# Patient Record
Sex: FEMALE | Race: WHITE | NOT HISPANIC OR LATINO | Employment: FULL TIME | URBAN - METROPOLITAN AREA
[De-identification: names, ages, dates, MRNs, and addresses within clinical notes are randomized per-mention and may not be internally consistent; named-entity substitution may affect disease eponyms.]

---

## 2019-01-31 NOTE — PROGRESS NOTES
ANNUAL VISIT NOTE     PRESENTING HISTORY     Reason for Visit:  Annual visit.    No chief complaint on file.      History of Present Illness & ROS: Ms. Divina Salmeron is a 33 y.o. female.  Here to establish care. New to this provider and clinic. Moved from [place]. Works in garden center.   Complaining of itching to arms and neck.    Review of Systems:  Eyes: denies visual changes at this time denies floaters   ENT: no nasal congestion or sore throat  Respiratory: no cough or shorness of breath  Cardiovascular: no chest pain or palpitations  Gastrointestinal: no nausea or vomiting, no abdominal pain or change in bowel habits  Genitourinary: no hematuria or dysuria; denies frequency  Hematologic/Lymphatic: no easy bruising or lymphadenopathy  Musculoskeletal: no arthralgias or myalgias  Neurological: no seizures or tremors  Endocrine: no heat or cold intolerance    PAST HISTORY:     Past Medical History:   Diagnosis Date    Oral herpes        No past surgical history on file.    Family History   Problem Relation Age of Onset    No Known Problems Paternal Grandfather     Hypertension Father     Diabetes Mother     Hypertension Mother     Breast cancer Paternal Aunt     Cancer Other        Social History     Socioeconomic History    Marital status: Single     Spouse name: Warren Ramos    Number of children: Not on file    Years of education: Not on file    Highest education level: Not on file   Social Needs    Financial resource strain: Not on file    Food insecurity - worry: Not on file    Food insecurity - inability: Not on file    Transportation needs - medical: Not on file    Transportation needs - non-medical: Not on file   Occupational History    Occupation: garden center      Employer: [employer]   Tobacco Use    Smoking status: Former Smoker    Smokeless tobacco: Never Used   Substance and Sexual Activity    Alcohol use: No     Comment: not with pregnancy    Drug use: No     "Sexual activity: Yes     Partners: Male   Other Topics Concern    Not on file   Social History Narrative    Pt: worked at "[employer]" likes outdoors    Partner: Warren - works at  [employer], bipolar OCD, heart disorder    Son: Josef       MEDICATIONS & ALLERGIES:     Current Outpatient Medications on File Prior to Visit   Medication Sig Dispense Refill    ibuprofen (ADVIL,MOTRIN) 600 MG tablet Take 1 tablet (600 mg total) by mouth every 6 (six) hours as needed for Pain (for pain). 30 tablet 1    PNV #35-IRON-FA #6-DHA ORAL Take by mouth.       No current facility-administered medications on file prior to visit.         Review of patient's allergies indicates:  No Known Allergies    Medications Reconciliation:   I have reconciled the patient's home medications and discharge medications with the patient/family. I have updated all changes.  Refer to After-Visit Medication List.    OBJECTIVE:     Vital Signs:  There were no vitals filed for this visit.  Wt Readings from Last 1 Encounters:   02/27/15 0627 88.5 kg (195 lb)   02/27/15 0140 88 kg (194 lb)     There is no height or weight on file to calculate BMI.   Wt Readings from Last 3 Encounters:   02/01/19 72 kg (158 lb 11.7 oz)   02/27/15 88.5 kg (195 lb)   02/25/15 88 kg (194 lb)     Temp Readings from Last 3 Encounters:   03/01/15 97.8 °F (36.6 °C) (Oral)     BP Readings from Last 3 Encounters:   02/01/19 117/74   03/30/15 120/82   03/01/15 110/62     Pulse Readings from Last 3 Encounters:   02/01/19 72   03/01/15 62       Physical Exam:  General: Well developed, well nourished. No distress.  HEENT: Head is normocephalic, atraumatic; ears are normal.   Eyes: Clear conjunctiva.  Neck: Supple, symmetrical neck; trachea midline.  Lungs: Clear to auscultation bilaterally and normal respiratory effort.  Cardiovascular: Heart with regular rate and rhythm. No murmurs, gallops or rubs  Extremities: No LE edema. Pulses 2+ and symmetric.   Abdomen: Abdomen is soft, " non-tender non-distended with normal bowel sounds.  Skin: Skin color, texture, turgor normal. No rashes.  Musculoskeletal: Normal gait.   Lymph Nodes: No cervical or supraclavicular adenopathy.  Neurologic: Normal strength and tone. No focal numbness or weakness.   Psychiatric: Not depressed.      Laboratory  Lab Results   Component Value Date    WBC 16.86 (H) 10/22/2014    HGB 10.9 (L) 10/22/2014    HCT 32.8 (L) 10/22/2014     10/22/2014         ASSESSMENT & PLAN:       Here for Preventative Wellness / Annual :  Labs:   CBC  CMP  Lipid Panel   TSH  A1c  Vitamin D Level  Ferritin      Screening:   Gyn: PAP and HPV testing:       Immunizations:   TDaP: today   Flu Vaccine: today       Atopic Dermatitis:   ` TAC  ` PRN Atarax  ` daily Xyzal  *Referral to Derm if not improving.    *Suggest follow up in 6 months. Sooner if indicated.       Future Appointments   Date Time Provider Department Center   2/1/2019  7:30 AM RANID Villar [facility] Chance Ernandez PCW          Medication List           Accurate as of 2/1/19  8:23 AM. If you have any questions, ask your nurse or doctor.               START taking these medications    hydrOXYzine HCl 25 MG tablet  Commonly known as:  ATARAX  Take 1 tablet (25 mg total) by mouth 3 (three) times daily as needed for Itching.  Started by:  RANDI Oviedo     levocetirizine 5 MG tablet  Commonly known as:  XYZAL  Take 1 tablet (5 mg total) by mouth every evening.  Started by:  RANDI Oviedo     triamcinolone acetonide 0.1% 0.1 % ointment  Commonly known as:  KENALOG  Apply topically 2 (two) times daily.  Started by:  RANDI Oviedo        CONTINUE taking these medications    ibuprofen 600 MG tablet  Commonly known as:  ADVIL,MOTRIN  Take 1 tablet (600 mg total) by mouth every 6 (six) hours as needed for Pain (for pain).     PNV #35-IRON-FA #6-DHA ORAL           Where to Get Your Medications      These medications were sent to  [id number]  [place] - [address] AT [facility] & [address]  [address] [address]    Hours:  24-hours Phone:  [phone]   · hydrOXYzine HCl 25 MG tablet  · levocetirizine 5 MG tablet  · triamcinolone acetonide 0.1% 0.1 % ointment         Signing Physician:  RANDI Oviedo

## 2019-02-01 ENCOUNTER — OFFICE VISIT (OUTPATIENT)
Dept: INTERNAL MEDICINE | Facility: CLINIC | Age: 34
End: 2019-02-01
Payer: COMMERCIAL

## 2019-02-01 ENCOUNTER — TELEPHONE (OUTPATIENT)
Dept: INTERNAL MEDICINE | Facility: CLINIC | Age: 34
End: 2019-02-01

## 2019-02-01 ENCOUNTER — LAB VISIT (OUTPATIENT)
Dept: LAB | Facility: HOSPITAL | Age: 34
End: 2019-02-01
Attending: NURSE PRACTITIONER
Payer: COMMERCIAL

## 2019-02-01 ENCOUNTER — IMMUNIZATION (OUTPATIENT)
Dept: PHARMACY | Facility: CLINIC | Age: 34
End: 2019-02-01

## 2019-02-01 ENCOUNTER — IMMUNIZATION (OUTPATIENT)
Dept: PHARMACY | Facility: CLINIC | Age: 34
End: 2019-02-01
Payer: COMMERCIAL

## 2019-02-01 VITALS
BODY MASS INDEX: 26.45 KG/M2 | OXYGEN SATURATION: 99 % | SYSTOLIC BLOOD PRESSURE: 117 MMHG | HEART RATE: 72 BPM | DIASTOLIC BLOOD PRESSURE: 74 MMHG | HEIGHT: 65 IN | WEIGHT: 158.75 LBS

## 2019-02-01 DIAGNOSIS — L20.9 ATOPIC DERMATITIS, UNSPECIFIED TYPE: ICD-10-CM

## 2019-02-01 DIAGNOSIS — Z00.00 PREVENTATIVE HEALTH CARE: Primary | ICD-10-CM

## 2019-02-01 DIAGNOSIS — Z00.00 PREVENTATIVE HEALTH CARE: ICD-10-CM

## 2019-02-01 DIAGNOSIS — E78.00 ELEVATED CHOLESTEROL: Primary | ICD-10-CM

## 2019-02-01 DIAGNOSIS — E55.9 VITAMIN D INSUFFICIENCY: ICD-10-CM

## 2019-02-01 LAB
25(OH)D3+25(OH)D2 SERPL-MCNC: 24 NG/ML
ALBUMIN SERPL BCP-MCNC: 3.9 G/DL
ALP SERPL-CCNC: 53 U/L
ALT SERPL W/O P-5'-P-CCNC: 12 U/L
ANION GAP SERPL CALC-SCNC: 8 MMOL/L
AST SERPL-CCNC: 15 U/L
BASOPHILS # BLD AUTO: 0.03 K/UL
BASOPHILS NFR BLD: 0.5 %
BILIRUB SERPL-MCNC: 0.6 MG/DL
BUN SERPL-MCNC: 14 MG/DL
CALCIUM SERPL-MCNC: 9.5 MG/DL
CHLORIDE SERPL-SCNC: 105 MMOL/L
CHOLEST SERPL-MCNC: 216 MG/DL
CHOLEST/HDLC SERPL: 3.6 {RATIO}
CO2 SERPL-SCNC: 28 MMOL/L
CREAT SERPL-MCNC: 0.8 MG/DL
DIFFERENTIAL METHOD: NORMAL
EOSINOPHIL # BLD AUTO: 0.2 K/UL
EOSINOPHIL NFR BLD: 2.4 %
ERYTHROCYTE [DISTWIDTH] IN BLOOD BY AUTOMATED COUNT: 12.9 %
EST. GFR  (AFRICAN AMERICAN): >60 ML/MIN/1.73 M^2
EST. GFR  (NON AFRICAN AMERICAN): >60 ML/MIN/1.73 M^2
ESTIMATED AVG GLUCOSE: 111 MG/DL
FERRITIN SERPL-MCNC: 51 NG/ML
GLUCOSE SERPL-MCNC: 88 MG/DL
HBA1C MFR BLD HPLC: 5.5 %
HCT VFR BLD AUTO: 40.1 %
HDLC SERPL-MCNC: 60 MG/DL
HDLC SERPL: 27.8 %
HGB BLD-MCNC: 13.2 G/DL
LDLC SERPL CALC-MCNC: 141.6 MG/DL
LYMPHOCYTES # BLD AUTO: 1.9 K/UL
LYMPHOCYTES NFR BLD: 28.9 %
MCH RBC QN AUTO: 29.9 PG
MCHC RBC AUTO-ENTMCNC: 32.9 G/DL
MCV RBC AUTO: 91 FL
MONOCYTES # BLD AUTO: 0.7 K/UL
MONOCYTES NFR BLD: 11 %
NEUTROPHILS # BLD AUTO: 3.8 K/UL
NEUTROPHILS NFR BLD: 57.2 %
NONHDLC SERPL-MCNC: 156 MG/DL
PLATELET # BLD AUTO: 264 K/UL
PMV BLD AUTO: 9.6 FL
POTASSIUM SERPL-SCNC: 3.9 MMOL/L
PROT SERPL-MCNC: 7.1 G/DL
RBC # BLD AUTO: 4.41 M/UL
SODIUM SERPL-SCNC: 141 MMOL/L
TRIGL SERPL-MCNC: 72 MG/DL
TSH SERPL DL<=0.005 MIU/L-ACNC: 1.05 UIU/ML
WBC # BLD AUTO: 6.61 K/UL

## 2019-02-01 PROCEDURE — 82728 ASSAY OF FERRITIN: CPT

## 2019-02-01 PROCEDURE — 99385 PREV VISIT NEW AGE 18-39: CPT | Mod: S$GLB,,, | Performed by: NURSE PRACTITIONER

## 2019-02-01 PROCEDURE — 36415 COLL VENOUS BLD VENIPUNCTURE: CPT

## 2019-02-01 PROCEDURE — 99385 PR PREVENTIVE VISIT,NEW,18-39: ICD-10-PCS | Mod: S$GLB,,, | Performed by: NURSE PRACTITIONER

## 2019-02-01 PROCEDURE — 99999 PR PBB SHADOW E&M-NEW PATIENT-LVL IV: CPT | Mod: PBBFAC,,, | Performed by: NURSE PRACTITIONER

## 2019-02-01 PROCEDURE — 82306 VITAMIN D 25 HYDROXY: CPT

## 2019-02-01 PROCEDURE — 99999 PR PBB SHADOW E&M-NEW PATIENT-LVL IV: ICD-10-PCS | Mod: PBBFAC,,, | Performed by: NURSE PRACTITIONER

## 2019-02-01 PROCEDURE — 80053 COMPREHEN METABOLIC PANEL: CPT

## 2019-02-01 PROCEDURE — 80061 LIPID PANEL: CPT

## 2019-02-01 PROCEDURE — 85025 COMPLETE CBC W/AUTO DIFF WBC: CPT

## 2019-02-01 PROCEDURE — 83036 HEMOGLOBIN GLYCOSYLATED A1C: CPT

## 2019-02-01 PROCEDURE — 84443 ASSAY THYROID STIM HORMONE: CPT

## 2019-02-01 RX ORDER — TRIAMCINOLONE ACETONIDE 1 MG/G
OINTMENT TOPICAL 2 TIMES DAILY
Qty: 1 TUBE | Refills: 2 | Status: SHIPPED | OUTPATIENT
Start: 2019-02-01 | End: 2021-06-05

## 2019-02-01 RX ORDER — HYDROXYZINE HYDROCHLORIDE 25 MG/1
25 TABLET, FILM COATED ORAL 3 TIMES DAILY PRN
Qty: 20 TABLET | Refills: 0 | Status: SHIPPED | OUTPATIENT
Start: 2019-02-01 | End: 2019-02-22

## 2019-02-01 RX ORDER — VIT C/E/ZN/COPPR/LUTEIN/ZEAXAN 250MG-90MG
1000 CAPSULE ORAL DAILY
Refills: 0 | COMMUNITY
Start: 2019-02-01 | End: 2019-02-22

## 2019-02-01 RX ORDER — LEVOCETIRIZINE DIHYDROCHLORIDE 5 MG/1
5 TABLET, FILM COATED ORAL NIGHTLY
Qty: 30 TABLET | Refills: 1 | Status: SHIPPED | OUTPATIENT
Start: 2019-02-01 | End: 2021-06-05

## 2019-02-01 NOTE — TELEPHONE ENCOUNTER
Vitamin D: 24      TSH  Lab Results   Component Value Date    TSH 1.050 02/01/2019       CMP  Sodium   Date Value Ref Range Status   02/01/2019 141 136 - 145 mmol/L Final     Potassium   Date Value Ref Range Status   02/01/2019 3.9 3.5 - 5.1 mmol/L Final     Chloride   Date Value Ref Range Status   02/01/2019 105 95 - 110 mmol/L Final     CO2   Date Value Ref Range Status   02/01/2019 28 23 - 29 mmol/L Final     Glucose   Date Value Ref Range Status   02/01/2019 88 70 - 110 mg/dL Final     BUN, Bld   Date Value Ref Range Status   02/01/2019 14 6 - 20 mg/dL Final     Creatinine   Date Value Ref Range Status   02/01/2019 0.8 0.5 - 1.4 mg/dL Final     Calcium   Date Value Ref Range Status   02/01/2019 9.5 8.7 - 10.5 mg/dL Final     Total Protein   Date Value Ref Range Status   02/01/2019 7.1 6.0 - 8.4 g/dL Final     Albumin   Date Value Ref Range Status   02/01/2019 3.9 3.5 - 5.2 g/dL Final     Total Bilirubin   Date Value Ref Range Status   02/01/2019 0.6 0.1 - 1.0 mg/dL Final     Comment:     For infants and newborns, interpretation of results should be based  on gestational age, weight and in agreement with clinical  observations.  Premature Infant recommended reference ranges:  Up to 24 hours.............<8.0 mg/dL  Up to 48 hours............<12.0 mg/dL  3-5 days..................<15.0 mg/dL  6-29 days.................<15.0 mg/dL       Alkaline Phosphatase   Date Value Ref Range Status   02/01/2019 53 (L) 55 - 135 U/L Final     AST   Date Value Ref Range Status   02/01/2019 15 10 - 40 U/L Final     ALT   Date Value Ref Range Status   02/01/2019 12 10 - 44 U/L Final     Anion Gap   Date Value Ref Range Status   02/01/2019 8 8 - 16 mmol/L Final     eGFR if    Date Value Ref Range Status   02/01/2019 >60.0 >60 mL/min/1.73 m^2 Final     eGFR if non    Date Value Ref Range Status   02/01/2019 >60.0 >60 mL/min/1.73 m^2 Final     Comment:     Calculation used to obtain the estimated  glomerular filtration  rate (eGFR) is the CKD-EPI equation.          CBC  Lab Results   Component Value Date    WBC 6.61 02/01/2019    HGB 13.2 02/01/2019    HCT 40.1 02/01/2019    MCV 91 02/01/2019     02/01/2019     Ferritin: 51    A1c: 5.5%      Lipid Panel   Lab Results   Component Value Date    CHOL 216 (H) 02/01/2019     Lab Results   Component Value Date    HDL 60 02/01/2019     Lab Results   Component Value Date    LDLCALC 141.6 02/01/2019     Lab Results   Component Value Date    TRIG 72 02/01/2019     Lab Results   Component Value Date    CHOLHDL 27.8 02/01/2019       A/P:   Vitamin D Insufficiency:   Repletion with Vitamin D 1000 units daily       Elevated Total Cholesterol   Discussed dietary modifications and exercise; would like to have her work on lifestyle changes before commencing to therapy as dicussed and she agreed. Cholesterol is marginally elevated, and reasonable in the plan.   ` repeat level in 3 months; if remains elevated, may need to be commenced to low dose statin therapy. She has no personal risk factors, but does have family associated risk factors.     RADHA

## 2019-02-22 ENCOUNTER — LAB VISIT (OUTPATIENT)
Dept: LAB | Facility: HOSPITAL | Age: 34
End: 2019-02-22
Attending: OBSTETRICS & GYNECOLOGY
Payer: COMMERCIAL

## 2019-02-22 ENCOUNTER — OFFICE VISIT (OUTPATIENT)
Dept: OBSTETRICS AND GYNECOLOGY | Facility: CLINIC | Age: 34
End: 2019-02-22
Payer: COMMERCIAL

## 2019-02-22 VITALS
WEIGHT: 155 LBS | BODY MASS INDEX: 25.83 KG/M2 | SYSTOLIC BLOOD PRESSURE: 116 MMHG | DIASTOLIC BLOOD PRESSURE: 60 MMHG | HEIGHT: 65 IN

## 2019-02-22 DIAGNOSIS — Z01.419 ENCOUNTER FOR ANNUAL ROUTINE GYNECOLOGICAL EXAMINATION: Primary | ICD-10-CM

## 2019-02-22 DIAGNOSIS — Z12.4 PAP SMEAR FOR CERVICAL CANCER SCREENING: ICD-10-CM

## 2019-02-22 DIAGNOSIS — Z01.419 ENCOUNTER FOR ANNUAL ROUTINE GYNECOLOGICAL EXAMINATION: ICD-10-CM

## 2019-02-22 DIAGNOSIS — Z11.3 SCREENING EXAMINATION FOR STD (SEXUALLY TRANSMITTED DISEASE): ICD-10-CM

## 2019-02-22 PROCEDURE — 88142 CYTOPATH C/V THIN LAYER: CPT

## 2019-02-22 PROCEDURE — 99385 PR PREVENTIVE VISIT,NEW,18-39: ICD-10-PCS | Mod: S$GLB,,, | Performed by: OBSTETRICS & GYNECOLOGY

## 2019-02-22 PROCEDURE — 36415 COLL VENOUS BLD VENIPUNCTURE: CPT

## 2019-02-22 PROCEDURE — 87624 HPV HI-RISK TYP POOLED RSLT: CPT

## 2019-02-22 PROCEDURE — 99385 PREV VISIT NEW AGE 18-39: CPT | Mod: S$GLB,,, | Performed by: OBSTETRICS & GYNECOLOGY

## 2019-02-22 PROCEDURE — 86592 SYPHILIS TEST NON-TREP QUAL: CPT

## 2019-02-22 PROCEDURE — 80074 ACUTE HEPATITIS PANEL: CPT

## 2019-02-22 PROCEDURE — 99999 PR PBB SHADOW E&M-EST. PATIENT-LVL III: CPT | Mod: PBBFAC,,, | Performed by: OBSTETRICS & GYNECOLOGY

## 2019-02-22 PROCEDURE — 87480 CANDIDA DNA DIR PROBE: CPT

## 2019-02-22 PROCEDURE — 86703 HIV-1/HIV-2 1 RESULT ANTBDY: CPT

## 2019-02-22 PROCEDURE — 99999 PR PBB SHADOW E&M-EST. PATIENT-LVL III: ICD-10-PCS | Mod: PBBFAC,,, | Performed by: OBSTETRICS & GYNECOLOGY

## 2019-02-22 PROCEDURE — 87491 CHLMYD TRACH DNA AMP PROBE: CPT

## 2019-02-22 NOTE — PROGRESS NOTES
",  Chief Complaint   Patient presents with    Well Woman       HISTORY OF PRESENT ILLNESS:   Divina Salmeron is a 33 y.o. female  who presents for well woman exam.  Patient's last menstrual period was 2019..  She has complains of vaginal bumps that have been there for years and not growing. Had genital wart in the past.  Cycles are regular. Desires STD testing.  declines for birth control, except for non-hormonal types .      Past Medical History:   Diagnosis Date    Oral herpes         History reviewed. No pertinent surgical history.      Social History     Socioeconomic History    Marital status: Single     Spouse name: Warren Ramos    Number of children: Not on file    Years of education: Not on file    Highest education level: Not on file   Social Needs    Financial resource strain: Not on file    Food insecurity - worry: Not on file    Food insecurity - inability: Not on file    Transportation needs - medical: Not on file    Transportation needs - non-medical: Not on file   Occupational History    Occupation: garden center      Employer: [employer]   Tobacco Use    Smoking status: Former Smoker    Smokeless tobacco: Never Used   Substance and Sexual Activity    Alcohol use: Yes     Comment: not with pregnancy    Drug use: No    Sexual activity: Not Currently     Partners: Male   Other Topics Concern    Not on file   Social History Narrative    Pt: worked at "[employer]" likes outdoors    Partner: Warren - works at  [employer], bipolar OCD, heart disorder    Son: Josef       Family History   Problem Relation Age of Onset    No Known Problems Paternal Grandfather     Hypertension Father     Diabetes Mother     Hypertension Mother     Breast cancer Paternal Aunt     Cancer Other          OB History    Para Term  AB Living   3 1 1   2 1   SAB TAB Ectopic Multiple Live Births     2   0 1      # Outcome Date GA Lbr Chris/2nd Weight Sex Delivery Anes PTL Lv   3 Term " "02/27/15 41w2d / 00:12 4.252 kg (9 lb 6 oz) M Vag-Spont Local N LUTHER   2 TAB  7w0d          1 TAB  8w0d                   COMPREHENSIVE GYN HISTORY:  PAP History: Denies abnormal Paps  Infection History: had genital warts in the past. Denies PID.  Benign History:Denies uterine fibroids. Denies ovarian cysts. Denies endometriosis Denies other conditions.  Cancer History: Denies cervical cancer. Denies uterine cancer or hyperplasia. Denies ovarian cancer. Denies vulvar cancer or pre-cancer. Denies vaginal cancer or pre-cancer. Denies breast cancer. Denies colon cancer.  Cycle: 13/mon/3-4  Contraception: condoms     ROS:  GENERAL: Denies weight gain or weight loss. Feeling well overall.   SKIN: Denies rash or lesions.   HEAD: Denies headache.   NODES: Denies enlarged lymph nodes.   CHEST: Denies shortness of breath.   ABDOMEN: No abdominal pain, constipation, diarrhea, nausea, vomiting or rectal bleeding.   URINARY: No frequency, dysuria, hematuria, or burning on urination.  REPRODUCTIVE: See HPI.   BREASTS: The patient denies pain, lumps, or nipple discharge.       /60   Ht 5' 5" (1.651 m)   Wt 70.3 kg (155 lb)   LMP 01/30/2019   BMI 25.79 kg/m²     APPEARANCE: Well nourished, well developed, in no acute distress.  NECK: Neck symmetric without  thyromegaly.  NODES: No inguinal, cervical lymph node enlargement.  CHEST: Lungs clear to auscultation.  HEART: Regular rate and rhythm, no murmurs, rubs or gallops.  ABDOMEN: Soft. No tenderness or masses. No hernias. No hepatosplenomegaly.  BREASTS: Symmetrical, no skin changes or visible lesions. No palpable masses, nipple discharge or adenopathy bilaterally.  PELVIC:   VULVA: No lesions. Normal female genitalia. Small 2mm nevus on left labia majora, 3mm sebaceous cyst on right labia majora  URETHRAL MEATUS: Normal size and location, no lesions, no prolapse.  URETHRA: No masses, tenderness, prolapse or scarring.  VAGINA: Moist and well rugated, no discharge, no " significant cystocele or rectocele.  CERVIX: No lesions and discharge.  UTERUS: Normal size, regular shape, mobile, non-tender, bladder base nontender.  ADNEXA: No masses or tenderness.      1. Encounter for annual routine gynecological examination    2. Pap smear for cervical cancer screening    3. Screening examination for STD (sexually transmitted disease)        Plan:  Routine gyn s/p normal breast exam. Pap With HPV cotesting ordered . STD testing: GC/CT/trich, syphilis, HBV/HCV and HIV ordered. Counseled on contraception and desires  To use condoms.       F/u in 1 yr or PRN

## 2019-02-22 NOTE — LETTER
February 22, 2019      Frida Beatty, FN  [address]  [facility] [address]           [facility]  [address], [address]  [address]th [address] [facility]  [place] [address]  Phone: [phone]          Patient: Divina Salmeron   MR Number: [id number]   YOB: 1985   Date of Visit: 2/22/2019       Dear Frida Beatty:    Thank you for referring Divina Salmeron to me for evaluation. Attached you will find relevant portions of my assessment and plan of care.    If you have questions, please do not hesitate to call me. I look forward to following Divina Salmeron along with you.    Sincerely,    Cat Sharp MD    Enclosure  CC:  No Recipients    If you would like to receive this communication electronically, please contact [email or web address] or [phone] to request more information on [facility] access.    For providers and/or their staff who would like to refer a patient to [facility], please contact us through our one-stop-shop provider referral line, Margot Michael, at [phone].    If you feel you have received this communication in error or would no longer like to receive these types of communications, please e-mail [email or web address]

## 2019-02-23 LAB — RPR SER QL: NORMAL

## 2019-02-25 LAB
C TRACH DNA SPEC QL NAA+PROBE: NOT DETECTED
HAV IGM SERPL QL IA: NEGATIVE
HBV CORE IGM SERPL QL IA: NEGATIVE
HBV SURFACE AG SERPL QL IA: NEGATIVE
HBV SURFACE AG SERPL QL IA: NEGATIVE
HCV AB SERPL QL IA: NEGATIVE
HIV 1+2 AB+HIV1 P24 AG SERPL QL IA: NEGATIVE
N GONORRHOEA DNA SPEC QL NAA+PROBE: NOT DETECTED

## 2019-02-26 ENCOUNTER — PATIENT MESSAGE (OUTPATIENT)
Dept: OBSTETRICS AND GYNECOLOGY | Facility: CLINIC | Age: 34
End: 2019-02-26

## 2019-02-27 LAB
HPV HR 12 DNA CVX QL NAA+PROBE: POSITIVE
HPV16 AG SPEC QL: POSITIVE
HPV18 DNA SPEC QL NAA+PROBE: NEGATIVE

## 2019-02-28 ENCOUNTER — TELEPHONE (OUTPATIENT)
Dept: OBSTETRICS AND GYNECOLOGY | Facility: CLINIC | Age: 34
End: 2019-02-28

## 2019-03-01 ENCOUNTER — PATIENT MESSAGE (OUTPATIENT)
Dept: OBSTETRICS AND GYNECOLOGY | Facility: CLINIC | Age: 34
End: 2019-03-01

## 2019-03-01 NOTE — TELEPHONE ENCOUNTER
Called patient and answered all questions. She reports she has a high ductable so wanted to know the cost of the procedure. Gave her the number to call. She inquired about if we didn't do colpo and I would recommend repeating the pap smear next year if she didn't.

## 2019-03-07 ENCOUNTER — PATIENT MESSAGE (OUTPATIENT)
Dept: OBSTETRICS AND GYNECOLOGY | Facility: CLINIC | Age: 34
End: 2019-03-07

## 2019-03-07 LAB
BACTERIAL VAGINOSIS DNA: NORMAL
CANDIDA GLABRATA DNA: NORMAL
CANDIDA KRUSEI DNA: NORMAL
CANDIDA RRNA VAG QL PROBE: NORMAL
G VAGINALIS RRNA GENITAL QL PROBE: NORMAL
T VAGINALIS RRNA GENITAL QL PROBE: NORMAL

## 2019-03-22 ENCOUNTER — TELEPHONE (OUTPATIENT)
Dept: INTERNAL MEDICINE | Facility: CLINIC | Age: 34
End: 2019-03-22

## 2019-03-22 ENCOUNTER — PATIENT MESSAGE (OUTPATIENT)
Dept: OBSTETRICS AND GYNECOLOGY | Facility: CLINIC | Age: 34
End: 2019-03-22

## 2019-03-22 ENCOUNTER — PATIENT MESSAGE (OUTPATIENT)
Dept: INTERNAL MEDICINE | Facility: CLINIC | Age: 34
End: 2019-03-22

## 2019-03-22 ENCOUNTER — LAB VISIT (OUTPATIENT)
Dept: LAB | Facility: HOSPITAL | Age: 34
End: 2019-03-22
Payer: COMMERCIAL

## 2019-03-22 DIAGNOSIS — E78.00 ELEVATED CHOLESTEROL: Primary | ICD-10-CM

## 2019-03-22 DIAGNOSIS — E78.00 ELEVATED CHOLESTEROL: ICD-10-CM

## 2019-03-22 LAB
CHOLEST SERPL-MCNC: 223 MG/DL
CHOLEST/HDLC SERPL: 3.5 {RATIO}
HDLC SERPL-MCNC: 64 MG/DL
HDLC SERPL: 28.7 %
LDLC SERPL CALC-MCNC: 146.2 MG/DL
NONHDLC SERPL-MCNC: 159 MG/DL
TRIGL SERPL-MCNC: 64 MG/DL

## 2019-03-22 PROCEDURE — 80061 LIPID PANEL: CPT

## 2019-03-22 PROCEDURE — 36415 COLL VENOUS BLD VENIPUNCTURE: CPT

## 2019-03-22 NOTE — TELEPHONE ENCOUNTER
Tried to call on cell number listed, no answer. Message left and also messaged via the portal.

## 2019-03-22 NOTE — TELEPHONE ENCOUNTER
Have messaged patient in regards to starting a lipid lowering agent; opposed during initial results of 216.   Reply pending.

## 2019-03-22 NOTE — TELEPHONE ENCOUNTER
Opposed to starting Lipid lowering agents, understands the implications.   Will refer to dietary.   Repeat in 3 months.

## 2019-05-06 PROBLEM — Z00.00 PREVENTATIVE HEALTH CARE: Status: RESOLVED | Noted: 2019-02-01 | Resolved: 2019-05-06

## 2019-05-08 ENCOUNTER — PATIENT MESSAGE (OUTPATIENT)
Dept: OBSTETRICS AND GYNECOLOGY | Facility: CLINIC | Age: 34
End: 2019-05-08

## 2019-05-08 RX ORDER — NORETHINDRONE ACETATE AND ETHINYL ESTRADIOL .02; 1 MG/1; MG/1
1 TABLET ORAL DAILY
Qty: 28 TABLET | Refills: 11 | Status: SHIPPED | OUTPATIENT
Start: 2019-05-08 | End: 2021-06-05

## 2019-09-13 ENCOUNTER — LAB VISIT (OUTPATIENT)
Dept: LAB | Facility: HOSPITAL | Age: 34
End: 2019-09-13
Attending: INTERNAL MEDICINE
Payer: COMMERCIAL

## 2019-09-13 DIAGNOSIS — E78.00 ELEVATED CHOLESTEROL: ICD-10-CM

## 2019-09-13 LAB
CHOLEST SERPL-MCNC: 207 MG/DL (ref 120–199)
CHOLEST/HDLC SERPL: 3.8 {RATIO} (ref 2–5)
HDLC SERPL-MCNC: 54 MG/DL (ref 40–75)
HDLC SERPL: 26.1 % (ref 20–50)
LDLC SERPL CALC-MCNC: 133.6 MG/DL (ref 63–159)
NONHDLC SERPL-MCNC: 153 MG/DL
TRIGL SERPL-MCNC: 97 MG/DL (ref 30–150)

## 2019-09-13 PROCEDURE — 80061 LIPID PANEL: CPT

## 2019-09-13 PROCEDURE — 36415 COLL VENOUS BLD VENIPUNCTURE: CPT

## 2019-09-15 ENCOUNTER — TELEPHONE (OUTPATIENT)
Dept: INTERNAL MEDICINE | Facility: CLINIC | Age: 34
End: 2019-09-15

## 2019-09-16 NOTE — TELEPHONE ENCOUNTER
Lab Results   Component Value Date    CHOL 207 (H) 09/13/2019    CHOL 223 (H) 03/22/2019    CHOL 216 (H) 02/01/2019     Lab Results   Component Value Date    HDL 54 09/13/2019    HDL 64 03/22/2019    HDL 60 02/01/2019     Lab Results   Component Value Date    LDLCALC 133.6 09/13/2019    LDLCALC 146.2 03/22/2019    LDLCALC 141.6 02/01/2019     Lab Results   Component Value Date    TRIG 97 09/13/2019    TRIG 64 03/22/2019    TRIG 72 02/01/2019     Lab Results   Component Value Date    CHOLHDL 26.1 09/13/2019    CHOLHDL 28.7 03/22/2019    CHOLHDL 27.8 02/01/2019     Continue to gain control with diet; educated.   No changes at this time.     Advised to make an appt and schedule follow in Feb with Dr. Chandler to be fully est'd in care.     RADHA

## 2020-09-11 ENCOUNTER — OCCUPATIONAL HEALTH (OUTPATIENT)
Dept: URGENT CARE | Facility: CLINIC | Age: 35
End: 2020-09-11

## 2020-09-11 DIAGNOSIS — Z02.83 ENCOUNTER FOR DRUG SCREENING: Primary | ICD-10-CM

## 2020-09-11 LAB — BREATH ALCOHOL: 0

## 2020-09-11 PROCEDURE — 82075 POCT BREATH ALCOHOL TEST: ICD-10-PCS | Mod: S$GLB,,, | Performed by: NURSE PRACTITIONER

## 2020-09-11 PROCEDURE — 82075 ASSAY OF BREATH ETHANOL: CPT | Mod: S$GLB,,, | Performed by: NURSE PRACTITIONER

## 2020-09-11 PROCEDURE — 80305 OOH COLLECTION ONLY DRUG SCREEN: ICD-10-PCS | Mod: S$GLB,,, | Performed by: NURSE PRACTITIONER

## 2020-09-11 PROCEDURE — 80305 DRUG TEST PRSMV DIR OPT OBS: CPT | Mod: S$GLB,,, | Performed by: NURSE PRACTITIONER

## 2021-04-02 ENCOUNTER — IMMUNIZATION (OUTPATIENT)
Dept: PRIMARY CARE CLINIC | Facility: CLINIC | Age: 36
End: 2021-04-02
Payer: COMMERCIAL

## 2021-04-02 DIAGNOSIS — Z23 NEED FOR VACCINATION: Primary | ICD-10-CM

## 2021-04-02 PROCEDURE — 91300 PR SARS-COV- 2 COVID-19 VACCINE, NO PRSV, 30MCG/0.3ML, IM: ICD-10-PCS | Mod: S$GLB,,, | Performed by: INTERNAL MEDICINE

## 2021-04-02 PROCEDURE — 0001A PR IMMUNIZ ADMIN, SARS-COV-2 COVID-19 VACC, 30MCG/0.3ML, 1ST DOSE: ICD-10-PCS | Mod: CV19,S$GLB,, | Performed by: INTERNAL MEDICINE

## 2021-04-02 PROCEDURE — 0001A PR IMMUNIZ ADMIN, SARS-COV-2 COVID-19 VACC, 30MCG/0.3ML, 1ST DOSE: CPT | Mod: CV19,S$GLB,, | Performed by: INTERNAL MEDICINE

## 2021-04-02 PROCEDURE — 91300 PR SARS-COV- 2 COVID-19 VACCINE, NO PRSV, 30MCG/0.3ML, IM: CPT | Mod: S$GLB,,, | Performed by: INTERNAL MEDICINE

## 2021-04-02 RX ADMIN — Medication 0.3 ML: at 09:04

## 2021-04-23 ENCOUNTER — IMMUNIZATION (OUTPATIENT)
Dept: PRIMARY CARE CLINIC | Facility: CLINIC | Age: 36
End: 2021-04-23
Payer: COMMERCIAL

## 2021-04-23 DIAGNOSIS — Z23 NEED FOR VACCINATION: Primary | ICD-10-CM

## 2021-04-23 PROCEDURE — 0002A PR IMMUNIZ ADMIN, SARS-COV-2 COVID-19 VACC, 30MCG/0.3ML, 2ND DOSE: CPT | Mod: CV19,S$GLB,, | Performed by: INTERNAL MEDICINE

## 2021-04-23 PROCEDURE — 0002A PR IMMUNIZ ADMIN, SARS-COV-2 COVID-19 VACC, 30MCG/0.3ML, 2ND DOSE: ICD-10-PCS | Mod: CV19,S$GLB,, | Performed by: INTERNAL MEDICINE

## 2021-04-23 PROCEDURE — 91300 PR SARS-COV- 2 COVID-19 VACCINE, NO PRSV, 30MCG/0.3ML, IM: ICD-10-PCS | Mod: S$GLB,,, | Performed by: INTERNAL MEDICINE

## 2021-04-23 PROCEDURE — 91300 PR SARS-COV- 2 COVID-19 VACCINE, NO PRSV, 30MCG/0.3ML, IM: CPT | Mod: S$GLB,,, | Performed by: INTERNAL MEDICINE

## 2021-04-23 RX ADMIN — Medication 0.3 ML: at 09:04

## 2021-05-19 ENCOUNTER — OFFICE VISIT (OUTPATIENT)
Dept: OPTOMETRY | Facility: CLINIC | Age: 36
End: 2021-05-19
Payer: COMMERCIAL

## 2021-05-19 DIAGNOSIS — H52.7 ACCOMMODATIVE DYSFUNCTION: ICD-10-CM

## 2021-05-19 DIAGNOSIS — H52.201 MYOPIA WITH ASTIGMATISM, RIGHT: Primary | ICD-10-CM

## 2021-05-19 DIAGNOSIS — H52.4 ASTIGMATISM OF LEFT EYE WITH PRESBYOPIA: ICD-10-CM

## 2021-05-19 DIAGNOSIS — H52.11 MYOPIA WITH ASTIGMATISM, RIGHT: Primary | ICD-10-CM

## 2021-05-19 DIAGNOSIS — G44.89 OTHER HEADACHE SYNDROME: ICD-10-CM

## 2021-05-19 DIAGNOSIS — H52.202 ASTIGMATISM OF LEFT EYE WITH PRESBYOPIA: ICD-10-CM

## 2021-05-19 PROCEDURE — 92004 PR EYE EXAM, NEW PATIENT,COMPREHESV: ICD-10-PCS | Mod: S$GLB,,, | Performed by: OPTOMETRIST

## 2021-05-19 PROCEDURE — 92015 DETERMINE REFRACTIVE STATE: CPT | Mod: S$GLB,,, | Performed by: OPTOMETRIST

## 2021-05-19 PROCEDURE — 99999 PR PBB SHADOW E&M-EST. PATIENT-LVL II: ICD-10-PCS | Mod: PBBFAC,,, | Performed by: OPTOMETRIST

## 2021-05-19 PROCEDURE — 92004 COMPRE OPH EXAM NEW PT 1/>: CPT | Mod: S$GLB,,, | Performed by: OPTOMETRIST

## 2021-05-19 PROCEDURE — 92015 PR REFRACTION: ICD-10-PCS | Mod: S$GLB,,, | Performed by: OPTOMETRIST

## 2021-05-19 PROCEDURE — 99999 PR PBB SHADOW E&M-EST. PATIENT-LVL II: CPT | Mod: PBBFAC,,, | Performed by: OPTOMETRIST

## 2021-06-04 ENCOUNTER — OFFICE VISIT (OUTPATIENT)
Dept: OBSTETRICS AND GYNECOLOGY | Facility: CLINIC | Age: 36
End: 2021-06-04
Payer: COMMERCIAL

## 2021-06-04 VITALS
OXYGEN SATURATION: 98 % | SYSTOLIC BLOOD PRESSURE: 112 MMHG | BODY MASS INDEX: 32.94 KG/M2 | DIASTOLIC BLOOD PRESSURE: 72 MMHG | WEIGHT: 198 LBS | HEART RATE: 74 BPM | RESPIRATION RATE: 16 BRPM

## 2021-06-04 DIAGNOSIS — Z72.0 TOBACCO ABUSE: ICD-10-CM

## 2021-06-04 DIAGNOSIS — Z12.4 PAP SMEAR FOR CERVICAL CANCER SCREENING: ICD-10-CM

## 2021-06-04 DIAGNOSIS — Z01.419 ENCOUNTER FOR ANNUAL ROUTINE GYNECOLOGICAL EXAMINATION: Primary | ICD-10-CM

## 2021-06-04 DIAGNOSIS — Z86.19 HISTORY OF HPV INFECTION: ICD-10-CM

## 2021-06-04 PROCEDURE — 99395 PREV VISIT EST AGE 18-39: CPT | Mod: S$GLB,,, | Performed by: OBSTETRICS & GYNECOLOGY

## 2021-06-04 PROCEDURE — 87624 HPV HI-RISK TYP POOLED RSLT: CPT | Performed by: OBSTETRICS & GYNECOLOGY

## 2021-06-04 PROCEDURE — 1126F AMNT PAIN NOTED NONE PRSNT: CPT | Mod: S$GLB,,, | Performed by: OBSTETRICS & GYNECOLOGY

## 2021-06-04 PROCEDURE — 3008F BODY MASS INDEX DOCD: CPT | Mod: CPTII,S$GLB,, | Performed by: OBSTETRICS & GYNECOLOGY

## 2021-06-04 PROCEDURE — 99999 PR PBB SHADOW E&M-EST. PATIENT-LVL III: CPT | Mod: PBBFAC,,, | Performed by: OBSTETRICS & GYNECOLOGY

## 2021-06-04 PROCEDURE — 88175 CYTOPATH C/V AUTO FLUID REDO: CPT | Performed by: OBSTETRICS & GYNECOLOGY

## 2021-06-04 PROCEDURE — 99999 PR PBB SHADOW E&M-EST. PATIENT-LVL III: ICD-10-PCS | Mod: PBBFAC,,, | Performed by: OBSTETRICS & GYNECOLOGY

## 2021-06-04 PROCEDURE — 99395 PR PREVENTIVE VISIT,EST,18-39: ICD-10-PCS | Mod: S$GLB,,, | Performed by: OBSTETRICS & GYNECOLOGY

## 2021-06-04 PROCEDURE — 1126F PR PAIN SEVERITY QUANTIFIED, NO PAIN PRESENT: ICD-10-PCS | Mod: S$GLB,,, | Performed by: OBSTETRICS & GYNECOLOGY

## 2021-06-04 PROCEDURE — 3008F PR BODY MASS INDEX (BMI) DOCUMENTED: ICD-10-PCS | Mod: CPTII,S$GLB,, | Performed by: OBSTETRICS & GYNECOLOGY

## 2021-06-10 LAB
FINAL PATHOLOGIC DIAGNOSIS: NORMAL
Lab: NORMAL

## 2021-06-15 LAB
HPV HR 12 DNA SPEC QL NAA+PROBE: POSITIVE
HPV16 AG SPEC QL: NEGATIVE
HPV18 DNA SPEC QL NAA+PROBE: NEGATIVE

## 2021-06-28 ENCOUNTER — CLINICAL SUPPORT (OUTPATIENT)
Dept: SMOKING CESSATION | Facility: CLINIC | Age: 36
End: 2021-06-28

## 2021-06-28 DIAGNOSIS — F17.200 NICOTINE DEPENDENCE: Primary | ICD-10-CM

## 2021-06-28 PROCEDURE — 99999 PR PBB SHADOW E&M-EST. PATIENT-LVL I: CPT | Mod: PBBFAC,,,

## 2021-06-28 PROCEDURE — 99404 PR PREVENT COUNSEL,INDIV,60 MIN: ICD-10-PCS | Mod: S$GLB,,,

## 2021-06-28 PROCEDURE — 99999 PR PBB SHADOW E&M-EST. PATIENT-LVL I: ICD-10-PCS | Mod: PBBFAC,,,

## 2021-06-28 PROCEDURE — 99404 PREV MED CNSL INDIV APPRX 60: CPT | Mod: S$GLB,,,

## 2021-06-28 RX ORDER — VARENICLINE TARTRATE 0.5 (11)-1
KIT ORAL
Qty: 53 TABLET | Refills: 0 | Status: SHIPPED | OUTPATIENT
Start: 2021-06-28 | End: 2021-07-20

## 2021-07-06 ENCOUNTER — CLINICAL SUPPORT (OUTPATIENT)
Dept: SMOKING CESSATION | Facility: CLINIC | Age: 36
End: 2021-07-06

## 2021-07-06 DIAGNOSIS — F17.200 NICOTINE DEPENDENCE: Primary | ICD-10-CM

## 2021-07-06 PROCEDURE — 99403 PREV MED CNSL INDIV APPRX 45: CPT | Mod: S$GLB,,,

## 2021-07-06 PROCEDURE — 99403 PR PREVENT COUNSEL,INDIV,45 MIN: ICD-10-PCS | Mod: S$GLB,,,

## 2021-07-12 ENCOUNTER — CLINICAL SUPPORT (OUTPATIENT)
Dept: SMOKING CESSATION | Facility: CLINIC | Age: 36
End: 2021-07-12

## 2021-07-12 DIAGNOSIS — F17.200 NICOTINE DEPENDENCE: Primary | ICD-10-CM

## 2021-07-12 PROCEDURE — 99404 PR PREVENT COUNSEL,INDIV,60 MIN: ICD-10-PCS | Mod: S$GLB,,,

## 2021-07-12 PROCEDURE — 99404 PREV MED CNSL INDIV APPRX 60: CPT | Mod: S$GLB,,,

## 2021-07-20 ENCOUNTER — CLINICAL SUPPORT (OUTPATIENT)
Dept: SMOKING CESSATION | Facility: CLINIC | Age: 36
End: 2021-07-20

## 2021-07-20 DIAGNOSIS — F17.200 NICOTINE DEPENDENCE: Primary | ICD-10-CM

## 2021-07-20 PROCEDURE — 99999 PR PBB SHADOW E&M-EST. PATIENT-LVL I: CPT | Mod: PBBFAC,,,

## 2021-07-20 PROCEDURE — 99407 PR TOBACCO USE CESSATION INTENSIVE >10 MINUTES: ICD-10-PCS | Mod: S$GLB,,,

## 2021-07-20 PROCEDURE — 99999 PR PBB SHADOW E&M-EST. PATIENT-LVL I: ICD-10-PCS | Mod: PBBFAC,,,

## 2021-07-20 PROCEDURE — 99407 BEHAV CHNG SMOKING > 10 MIN: CPT | Mod: S$GLB,,,

## 2021-07-20 RX ORDER — VARENICLINE TARTRATE 1 MG/1
1 TABLET, FILM COATED ORAL 2 TIMES DAILY
Qty: 60 TABLET | Refills: 0 | Status: SHIPPED | OUTPATIENT
Start: 2021-07-20 | End: 2021-10-12

## 2021-07-27 ENCOUNTER — OFFICE VISIT (OUTPATIENT)
Dept: URGENT CARE | Facility: CLINIC | Age: 36
End: 2021-07-27
Payer: COMMERCIAL

## 2021-07-27 VITALS
OXYGEN SATURATION: 98 % | HEART RATE: 71 BPM | BODY MASS INDEX: 31.34 KG/M2 | RESPIRATION RATE: 16 BRPM | TEMPERATURE: 98 F | WEIGHT: 195 LBS | DIASTOLIC BLOOD PRESSURE: 77 MMHG | HEIGHT: 66 IN | SYSTOLIC BLOOD PRESSURE: 117 MMHG

## 2021-07-27 DIAGNOSIS — R09.81 NASAL CONGESTION: ICD-10-CM

## 2021-07-27 DIAGNOSIS — J06.9 VIRAL URI: Primary | ICD-10-CM

## 2021-07-27 LAB
CTP QC/QA: YES
SARS-COV-2 RDRP RESP QL NAA+PROBE: NEGATIVE

## 2021-07-27 PROCEDURE — 1160F PR REVIEW ALL MEDS BY PRESCRIBER/CLIN PHARMACIST DOCUMENTED: ICD-10-PCS | Mod: CPTII,S$GLB,, | Performed by: NURSE PRACTITIONER

## 2021-07-27 PROCEDURE — 99213 PR OFFICE/OUTPT VISIT, EST, LEVL III, 20-29 MIN: ICD-10-PCS | Mod: S$GLB,,, | Performed by: NURSE PRACTITIONER

## 2021-07-27 PROCEDURE — 1160F RVW MEDS BY RX/DR IN RCRD: CPT | Mod: CPTII,S$GLB,, | Performed by: NURSE PRACTITIONER

## 2021-07-27 PROCEDURE — 99213 OFFICE O/P EST LOW 20 MIN: CPT | Mod: S$GLB,,, | Performed by: NURSE PRACTITIONER

## 2021-07-27 PROCEDURE — U0002 COVID-19 LAB TEST NON-CDC: HCPCS | Mod: QW,S$GLB,, | Performed by: NURSE PRACTITIONER

## 2021-07-27 PROCEDURE — 3008F PR BODY MASS INDEX (BMI) DOCUMENTED: ICD-10-PCS | Mod: CPTII,S$GLB,, | Performed by: NURSE PRACTITIONER

## 2021-07-27 PROCEDURE — 3008F BODY MASS INDEX DOCD: CPT | Mod: CPTII,S$GLB,, | Performed by: NURSE PRACTITIONER

## 2021-07-27 PROCEDURE — 1159F PR MEDICATION LIST DOCUMENTED IN MEDICAL RECORD: ICD-10-PCS | Mod: CPTII,S$GLB,, | Performed by: NURSE PRACTITIONER

## 2021-07-27 PROCEDURE — 1159F MED LIST DOCD IN RCRD: CPT | Mod: CPTII,S$GLB,, | Performed by: NURSE PRACTITIONER

## 2021-07-27 PROCEDURE — U0002: ICD-10-PCS | Mod: QW,S$GLB,, | Performed by: NURSE PRACTITIONER

## 2021-08-03 ENCOUNTER — CLINICAL SUPPORT (OUTPATIENT)
Dept: SMOKING CESSATION | Facility: CLINIC | Age: 36
End: 2021-08-03

## 2021-08-03 DIAGNOSIS — F17.200 NICOTINE DEPENDENCE: Primary | ICD-10-CM

## 2021-08-03 PROCEDURE — 99403 PR PREVENT COUNSEL,INDIV,45 MIN: ICD-10-PCS | Mod: S$GLB,,,

## 2021-08-03 PROCEDURE — 99403 PREV MED CNSL INDIV APPRX 45: CPT | Mod: S$GLB,,,

## 2021-08-10 ENCOUNTER — CLINICAL SUPPORT (OUTPATIENT)
Dept: SMOKING CESSATION | Facility: CLINIC | Age: 36
End: 2021-08-10

## 2021-08-10 DIAGNOSIS — F17.200 NICOTINE DEPENDENCE: Primary | ICD-10-CM

## 2021-08-10 PROCEDURE — 99403 PR PREVENT COUNSEL,INDIV,45 MIN: ICD-10-PCS | Mod: S$GLB,,,

## 2021-08-10 PROCEDURE — 99403 PREV MED CNSL INDIV APPRX 45: CPT | Mod: S$GLB,,,

## 2021-08-17 ENCOUNTER — CLINICAL SUPPORT (OUTPATIENT)
Dept: SMOKING CESSATION | Facility: CLINIC | Age: 36
End: 2021-08-17

## 2021-08-17 DIAGNOSIS — F17.200 NICOTINE DEPENDENCE: Primary | ICD-10-CM

## 2021-08-17 PROCEDURE — 99402 PR PREVENT COUNSEL,INDIV,30 MIN: ICD-10-PCS | Mod: S$GLB,,,

## 2021-08-17 PROCEDURE — 99402 PREV MED CNSL INDIV APPRX 30: CPT | Mod: S$GLB,,,

## 2021-08-18 ENCOUNTER — PATIENT MESSAGE (OUTPATIENT)
Dept: INTERNAL MEDICINE | Facility: CLINIC | Age: 36
End: 2021-08-18

## 2021-08-24 ENCOUNTER — CLINICAL SUPPORT (OUTPATIENT)
Dept: SMOKING CESSATION | Facility: CLINIC | Age: 36
End: 2021-08-24

## 2021-08-24 DIAGNOSIS — F17.200 NICOTINE DEPENDENCE: Primary | ICD-10-CM

## 2021-08-24 PROCEDURE — 99999 PR PBB SHADOW E&M-EST. PATIENT-LVL I: ICD-10-PCS | Mod: PBBFAC,,,

## 2021-08-24 PROCEDURE — 99403 PR PREVENT COUNSEL,INDIV,45 MIN: ICD-10-PCS | Mod: S$GLB,,,

## 2021-08-24 PROCEDURE — 99403 PREV MED CNSL INDIV APPRX 45: CPT | Mod: S$GLB,,,

## 2021-08-24 PROCEDURE — 99999 PR PBB SHADOW E&M-EST. PATIENT-LVL I: CPT | Mod: PBBFAC,,,

## 2021-09-07 ENCOUNTER — CLINICAL SUPPORT (OUTPATIENT)
Dept: SMOKING CESSATION | Facility: CLINIC | Age: 36
End: 2021-09-07

## 2021-09-07 DIAGNOSIS — F17.200 NICOTINE DEPENDENCE: Primary | ICD-10-CM

## 2021-09-07 PROCEDURE — 99406 PR TOBACCO USE CESSATION INTERMEDIATE 3-10 MINUTES: ICD-10-PCS | Mod: S$GLB,,,

## 2021-09-07 PROCEDURE — 99406 BEHAV CHNG SMOKING 3-10 MIN: CPT | Mod: S$GLB,,,

## 2021-09-16 ENCOUNTER — TELEPHONE (OUTPATIENT)
Dept: SMOKING CESSATION | Facility: CLINIC | Age: 36
End: 2021-09-16

## 2021-09-24 ENCOUNTER — OFFICE VISIT (OUTPATIENT)
Dept: PODIATRY | Facility: CLINIC | Age: 36
End: 2021-09-24
Payer: COMMERCIAL

## 2021-09-24 VITALS
WEIGHT: 195.13 LBS | BODY MASS INDEX: 31.36 KG/M2 | SYSTOLIC BLOOD PRESSURE: 122 MMHG | DIASTOLIC BLOOD PRESSURE: 69 MMHG | HEART RATE: 71 BPM | HEIGHT: 66 IN

## 2021-09-24 DIAGNOSIS — G57.53 TARSAL TUNNEL SYNDROME OF BOTH LOWER EXTREMITIES: ICD-10-CM

## 2021-09-24 DIAGNOSIS — M79.673 CHRONIC FOOT PAIN, UNSPECIFIED LATERALITY: ICD-10-CM

## 2021-09-24 DIAGNOSIS — G89.29 CHRONIC FOOT PAIN, UNSPECIFIED LATERALITY: ICD-10-CM

## 2021-09-24 DIAGNOSIS — M72.2 PLANTAR FASCIITIS: Primary | ICD-10-CM

## 2021-09-24 PROCEDURE — 3074F SYST BP LT 130 MM HG: CPT | Mod: CPTII,S$GLB,, | Performed by: PODIATRIST

## 2021-09-24 PROCEDURE — 3008F PR BODY MASS INDEX (BMI) DOCUMENTED: ICD-10-PCS | Mod: CPTII,S$GLB,, | Performed by: PODIATRIST

## 2021-09-24 PROCEDURE — 3008F BODY MASS INDEX DOCD: CPT | Mod: CPTII,S$GLB,, | Performed by: PODIATRIST

## 2021-09-24 PROCEDURE — 1160F PR REVIEW ALL MEDS BY PRESCRIBER/CLIN PHARMACIST DOCUMENTED: ICD-10-PCS | Mod: CPTII,S$GLB,, | Performed by: PODIATRIST

## 2021-09-24 PROCEDURE — 1159F MED LIST DOCD IN RCRD: CPT | Mod: CPTII,S$GLB,, | Performed by: PODIATRIST

## 2021-09-24 PROCEDURE — 99999 PR PBB SHADOW E&M-EST. PATIENT-LVL IV: ICD-10-PCS | Mod: PBBFAC,,, | Performed by: PODIATRIST

## 2021-09-24 PROCEDURE — 3078F DIAST BP <80 MM HG: CPT | Mod: CPTII,S$GLB,, | Performed by: PODIATRIST

## 2021-09-24 PROCEDURE — 1159F PR MEDICATION LIST DOCUMENTED IN MEDICAL RECORD: ICD-10-PCS | Mod: CPTII,S$GLB,, | Performed by: PODIATRIST

## 2021-09-24 PROCEDURE — 99203 OFFICE O/P NEW LOW 30 MIN: CPT | Mod: S$GLB,,, | Performed by: PODIATRIST

## 2021-09-24 PROCEDURE — 1160F RVW MEDS BY RX/DR IN RCRD: CPT | Mod: CPTII,S$GLB,, | Performed by: PODIATRIST

## 2021-09-24 PROCEDURE — 3078F PR MOST RECENT DIASTOLIC BLOOD PRESSURE < 80 MM HG: ICD-10-PCS | Mod: CPTII,S$GLB,, | Performed by: PODIATRIST

## 2021-09-24 PROCEDURE — 3074F PR MOST RECENT SYSTOLIC BLOOD PRESSURE < 130 MM HG: ICD-10-PCS | Mod: CPTII,S$GLB,, | Performed by: PODIATRIST

## 2021-09-24 PROCEDURE — 99203 PR OFFICE/OUTPT VISIT, NEW, LEVL III, 30-44 MIN: ICD-10-PCS | Mod: S$GLB,,, | Performed by: PODIATRIST

## 2021-09-24 PROCEDURE — 99999 PR PBB SHADOW E&M-EST. PATIENT-LVL IV: CPT | Mod: PBBFAC,,, | Performed by: PODIATRIST

## 2021-09-28 ENCOUNTER — CLINICAL SUPPORT (OUTPATIENT)
Dept: SMOKING CESSATION | Facility: CLINIC | Age: 36
End: 2021-09-28
Payer: COMMERCIAL

## 2021-09-28 DIAGNOSIS — F17.200 NICOTINE DEPENDENCE: Primary | ICD-10-CM

## 2021-09-28 PROCEDURE — 99407 BEHAV CHNG SMOKING > 10 MIN: CPT | Mod: S$PBB,,,

## 2021-09-28 PROCEDURE — 99407 PR TOBACCO USE CESSATION INTENSIVE >10 MINUTES: ICD-10-PCS | Mod: S$PBB,,,

## 2021-09-29 ENCOUNTER — CLINICAL SUPPORT (OUTPATIENT)
Dept: SMOKING CESSATION | Facility: CLINIC | Age: 36
End: 2021-09-29

## 2021-09-29 DIAGNOSIS — F17.200 NICOTINE DEPENDENCE: Primary | ICD-10-CM

## 2021-09-29 PROCEDURE — 90853 GROUP PSYCHOTHERAPY: CPT | Mod: S$GLB,,,

## 2021-09-29 PROCEDURE — 90853 PR GROUP PSYCHOTHERAPY: ICD-10-PCS | Mod: S$GLB,,,

## 2021-10-04 ENCOUNTER — CLINICAL SUPPORT (OUTPATIENT)
Dept: SMOKING CESSATION | Facility: CLINIC | Age: 36
End: 2021-10-04

## 2021-10-04 DIAGNOSIS — F17.200 NICOTINE DEPENDENCE: Primary | ICD-10-CM

## 2021-10-04 PROCEDURE — 90853 GROUP PSYCHOTHERAPY: CPT | Mod: S$GLB,,,

## 2021-10-04 PROCEDURE — 99999 PR PBB SHADOW E&M-EST. PATIENT-LVL I: ICD-10-PCS | Mod: PBBFAC,,,

## 2021-10-04 PROCEDURE — 90853 PR GROUP PSYCHOTHERAPY: ICD-10-PCS | Mod: S$GLB,,,

## 2021-10-04 PROCEDURE — 99999 PR PBB SHADOW E&M-EST. PATIENT-LVL I: CPT | Mod: PBBFAC,,,

## 2021-10-11 ENCOUNTER — CLINICAL SUPPORT (OUTPATIENT)
Dept: SMOKING CESSATION | Facility: CLINIC | Age: 36
End: 2021-10-11

## 2021-10-11 DIAGNOSIS — F17.200 NICOTINE DEPENDENCE: Primary | ICD-10-CM

## 2021-10-11 PROCEDURE — 90853 GROUP PSYCHOTHERAPY: CPT | Mod: S$GLB,,,

## 2021-10-11 PROCEDURE — 90853 PR GROUP PSYCHOTHERAPY: ICD-10-PCS | Mod: S$GLB,,,

## 2021-10-18 ENCOUNTER — CLINICAL SUPPORT (OUTPATIENT)
Dept: SMOKING CESSATION | Facility: CLINIC | Age: 36
End: 2021-10-18

## 2021-10-18 ENCOUNTER — TELEPHONE (OUTPATIENT)
Dept: SMOKING CESSATION | Facility: CLINIC | Age: 36
End: 2021-10-18

## 2021-10-18 DIAGNOSIS — F17.200 NICOTINE DEPENDENCE: Primary | ICD-10-CM

## 2021-10-18 PROCEDURE — 90853 GROUP PSYCHOTHERAPY: CPT | Mod: S$GLB,,,

## 2021-10-18 PROCEDURE — 90853 PR GROUP PSYCHOTHERAPY: ICD-10-PCS | Mod: S$GLB,,,

## 2021-10-25 ENCOUNTER — CLINICAL SUPPORT (OUTPATIENT)
Dept: SMOKING CESSATION | Facility: CLINIC | Age: 36
End: 2021-10-25

## 2021-10-25 DIAGNOSIS — F17.200 NICOTINE DEPENDENCE: Primary | ICD-10-CM

## 2021-10-25 PROCEDURE — 90853 GROUP PSYCHOTHERAPY: CPT | Mod: S$GLB,,,

## 2021-10-25 PROCEDURE — 99999 PR PBB SHADOW E&M-EST. PATIENT-LVL I: CPT | Mod: PBBFAC,,,

## 2021-10-25 PROCEDURE — 99999 PR PBB SHADOW E&M-EST. PATIENT-LVL I: ICD-10-PCS | Mod: PBBFAC,,,

## 2021-10-25 PROCEDURE — 90853 PR GROUP PSYCHOTHERAPY: ICD-10-PCS | Mod: S$GLB,,,

## 2021-11-01 ENCOUNTER — CLINICAL SUPPORT (OUTPATIENT)
Dept: SMOKING CESSATION | Facility: CLINIC | Age: 36
End: 2021-11-01

## 2021-11-01 DIAGNOSIS — F17.200 NICOTINE DEPENDENCE: Primary | ICD-10-CM

## 2021-11-01 PROCEDURE — 99403 PR PREVENT COUNSEL,INDIV,45 MIN: ICD-10-PCS | Mod: S$GLB,,,

## 2021-11-01 PROCEDURE — 99403 PREV MED CNSL INDIV APPRX 45: CPT | Mod: S$GLB,,,

## 2021-11-04 ENCOUNTER — CLINICAL SUPPORT (OUTPATIENT)
Dept: REHABILITATION | Facility: HOSPITAL | Age: 36
End: 2021-11-04
Attending: PODIATRIST
Payer: COMMERCIAL

## 2021-11-04 DIAGNOSIS — G57.53 TARSAL TUNNEL SYNDROME OF BOTH LOWER EXTREMITIES: ICD-10-CM

## 2021-11-04 DIAGNOSIS — M72.2 PLANTAR FASCIITIS: Primary | ICD-10-CM

## 2021-11-04 DIAGNOSIS — G89.29 CHRONIC FOOT PAIN, UNSPECIFIED LATERALITY: ICD-10-CM

## 2021-11-04 DIAGNOSIS — M79.673 CHRONIC FOOT PAIN, UNSPECIFIED LATERALITY: ICD-10-CM

## 2021-11-04 PROCEDURE — 97110 THERAPEUTIC EXERCISES: CPT | Mod: PO

## 2021-11-04 PROCEDURE — 97161 PT EVAL LOW COMPLEX 20 MIN: CPT | Mod: PO

## 2021-11-15 ENCOUNTER — CLINICAL SUPPORT (OUTPATIENT)
Dept: REHABILITATION | Facility: HOSPITAL | Age: 36
End: 2021-11-15
Attending: PODIATRIST
Payer: COMMERCIAL

## 2021-11-15 DIAGNOSIS — M72.2 PLANTAR FASCIITIS: Primary | ICD-10-CM

## 2021-11-15 PROCEDURE — 97110 THERAPEUTIC EXERCISES: CPT | Mod: PO

## 2021-11-16 ENCOUNTER — TELEPHONE (OUTPATIENT)
Dept: SMOKING CESSATION | Facility: CLINIC | Age: 36
End: 2021-11-16
Payer: COMMERCIAL

## 2021-11-17 ENCOUNTER — CLINICAL SUPPORT (OUTPATIENT)
Dept: REHABILITATION | Facility: HOSPITAL | Age: 36
End: 2021-11-17
Attending: PODIATRIST
Payer: COMMERCIAL

## 2021-11-17 DIAGNOSIS — M72.2 PLANTAR FASCIITIS: ICD-10-CM

## 2021-11-17 PROCEDURE — 97110 THERAPEUTIC EXERCISES: CPT | Mod: PO,CQ

## 2021-11-17 PROCEDURE — 97140 MANUAL THERAPY 1/> REGIONS: CPT | Mod: PO,CQ

## 2021-11-29 ENCOUNTER — CLINICAL SUPPORT (OUTPATIENT)
Dept: REHABILITATION | Facility: HOSPITAL | Age: 36
End: 2021-11-29
Attending: PODIATRIST
Payer: COMMERCIAL

## 2021-11-29 DIAGNOSIS — M72.2 PLANTAR FASCIITIS: Primary | ICD-10-CM

## 2021-11-29 PROCEDURE — 97140 MANUAL THERAPY 1/> REGIONS: CPT | Mod: PO

## 2021-11-29 PROCEDURE — 97110 THERAPEUTIC EXERCISES: CPT | Mod: PO

## 2021-12-01 ENCOUNTER — CLINICAL SUPPORT (OUTPATIENT)
Dept: REHABILITATION | Facility: HOSPITAL | Age: 36
End: 2021-12-01
Attending: PODIATRIST
Payer: COMMERCIAL

## 2021-12-01 DIAGNOSIS — M72.2 PLANTAR FASCIITIS: ICD-10-CM

## 2021-12-01 PROCEDURE — 97110 THERAPEUTIC EXERCISES: CPT | Mod: PO,CQ

## 2021-12-07 ENCOUNTER — CLINICAL SUPPORT (OUTPATIENT)
Dept: REHABILITATION | Facility: HOSPITAL | Age: 36
End: 2021-12-07
Attending: PODIATRIST
Payer: COMMERCIAL

## 2021-12-07 DIAGNOSIS — M72.2 PLANTAR FASCIITIS: Primary | ICD-10-CM

## 2021-12-07 DIAGNOSIS — R29.898 IMPAIRED STRENGTH OF HIP MUSCLES: ICD-10-CM

## 2021-12-07 PROCEDURE — 97110 THERAPEUTIC EXERCISES: CPT | Mod: PO

## 2021-12-09 ENCOUNTER — CLINICAL SUPPORT (OUTPATIENT)
Dept: REHABILITATION | Facility: HOSPITAL | Age: 36
End: 2021-12-09
Attending: PODIATRIST
Payer: COMMERCIAL

## 2021-12-09 DIAGNOSIS — R29.898 IMPAIRED STRENGTH OF HIP MUSCLES: ICD-10-CM

## 2021-12-09 DIAGNOSIS — M72.2 PLANTAR FASCIITIS: Primary | ICD-10-CM

## 2021-12-09 PROCEDURE — 97140 MANUAL THERAPY 1/> REGIONS: CPT | Mod: PO

## 2021-12-09 PROCEDURE — 97110 THERAPEUTIC EXERCISES: CPT | Mod: PO

## 2021-12-14 ENCOUNTER — CLINICAL SUPPORT (OUTPATIENT)
Dept: REHABILITATION | Facility: HOSPITAL | Age: 36
End: 2021-12-14
Attending: PODIATRIST
Payer: COMMERCIAL

## 2021-12-14 DIAGNOSIS — R29.898 IMPAIRED STRENGTH OF HIP MUSCLES: ICD-10-CM

## 2021-12-14 DIAGNOSIS — M72.2 PLANTAR FASCIITIS: ICD-10-CM

## 2021-12-14 PROCEDURE — 97110 THERAPEUTIC EXERCISES: CPT | Mod: PO,CQ

## 2021-12-16 ENCOUNTER — DOCUMENTATION ONLY (OUTPATIENT)
Dept: REHABILITATION | Facility: HOSPITAL | Age: 36
End: 2021-12-16
Payer: COMMERCIAL

## 2021-12-16 ENCOUNTER — CLINICAL SUPPORT (OUTPATIENT)
Dept: REHABILITATION | Facility: HOSPITAL | Age: 36
End: 2021-12-16
Attending: PODIATRIST
Payer: COMMERCIAL

## 2021-12-16 DIAGNOSIS — M72.2 PLANTAR FASCIITIS: ICD-10-CM

## 2021-12-16 DIAGNOSIS — R29.898 IMPAIRED STRENGTH OF HIP MUSCLES: ICD-10-CM

## 2021-12-16 PROCEDURE — 97110 THERAPEUTIC EXERCISES: CPT | Mod: PO,CQ

## 2021-12-22 ENCOUNTER — CLINICAL SUPPORT (OUTPATIENT)
Dept: REHABILITATION | Facility: HOSPITAL | Age: 36
End: 2021-12-22
Attending: PODIATRIST
Payer: COMMERCIAL

## 2021-12-22 DIAGNOSIS — R29.898 IMPAIRED STRENGTH OF HIP MUSCLES: ICD-10-CM

## 2021-12-22 DIAGNOSIS — M72.2 PLANTAR FASCIITIS: ICD-10-CM

## 2021-12-22 PROCEDURE — 97140 MANUAL THERAPY 1/> REGIONS: CPT | Mod: PO | Performed by: PHYSICAL THERAPIST

## 2021-12-22 PROCEDURE — 97110 THERAPEUTIC EXERCISES: CPT | Mod: PO | Performed by: PHYSICAL THERAPIST

## 2022-02-15 ENCOUNTER — CLINICAL SUPPORT (OUTPATIENT)
Dept: SMOKING CESSATION | Facility: CLINIC | Age: 37
End: 2022-02-15
Payer: COMMERCIAL

## 2022-02-15 DIAGNOSIS — F17.200 NICOTINE DEPENDENCE: Primary | ICD-10-CM

## 2022-02-15 PROCEDURE — 99999 PR PBB SHADOW E&M-EST. PATIENT-LVL I: ICD-10-PCS | Mod: PBBFAC,,,

## 2022-02-15 PROCEDURE — 99407 BEHAV CHNG SMOKING > 10 MIN: CPT | Mod: S$GLB,,,

## 2022-02-15 PROCEDURE — 99999 PR PBB SHADOW E&M-EST. PATIENT-LVL I: CPT | Mod: PBBFAC,,,

## 2022-02-15 PROCEDURE — 99407 PR TOBACCO USE CESSATION INTENSIVE >10 MINUTES: ICD-10-PCS | Mod: S$GLB,,,
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
## 2022-06-27 ENCOUNTER — CLINICAL SUPPORT (OUTPATIENT)
Dept: SMOKING CESSATION | Facility: CLINIC | Age: 37
End: 2022-06-27

## 2022-06-27 DIAGNOSIS — F17.200 NICOTINE DEPENDENCE: Primary | ICD-10-CM

## 2022-06-27 PROCEDURE — 99407 BEHAV CHNG SMOKING > 10 MIN: CPT | Mod: S$GLB,,,

## 2022-06-27 PROCEDURE — 99407 PR TOBACCO USE CESSATION INTENSIVE >10 MINUTES: ICD-10-PCS | Mod: S$GLB,,,

## 2022-07-12 ENCOUNTER — CLINICAL SUPPORT (OUTPATIENT)
Dept: SMOKING CESSATION | Facility: CLINIC | Age: 37
End: 2022-07-12

## 2022-07-12 DIAGNOSIS — F17.200 NICOTINE DEPENDENCE: Primary | ICD-10-CM

## 2022-07-12 PROCEDURE — 99404 PR PREVENT COUNSEL,INDIV,60 MIN: ICD-10-PCS | Mod: 95,,,

## 2022-07-12 PROCEDURE — 99404 PREV MED CNSL INDIV APPRX 60: CPT | Mod: 95,,,

## 2022-07-14 NOTE — PROGRESS NOTES
Patient will be participating in biweekly tobacco cessation meetings . Divina does not wish to use any smoking cessation mediation at this time, just wants support. She is listing to audio books and practicing mindfulness. Discussed making small but achievable goals to help with compliance and feeling proud. This week she will work on a survival bag with thing in it that makes her happy and she can do without smoking or rather in place of smoking.   Patient acknowledged that she is excited to do this and I look forward to or visits.

## 2022-07-16 ENCOUNTER — IMMUNIZATION (OUTPATIENT)
Dept: INTERNAL MEDICINE | Facility: CLINIC | Age: 37
End: 2022-07-16
Payer: COMMERCIAL

## 2022-07-16 DIAGNOSIS — Z23 NEED FOR VACCINATION: Primary | ICD-10-CM

## 2022-07-16 PROCEDURE — 0054A COVID-19, MRNA, LNP-S, PF, 30 MCG/0.3 ML DOSE VACCINE (PFIZER): CPT | Mod: CV19,PBBFAC | Performed by: INTERNAL MEDICINE

## 2022-07-22 ENCOUNTER — CLINICAL SUPPORT (OUTPATIENT)
Dept: SMOKING CESSATION | Facility: CLINIC | Age: 37
End: 2022-07-22

## 2022-07-22 DIAGNOSIS — F17.200 NICOTINE DEPENDENCE: Primary | ICD-10-CM

## 2022-07-22 PROCEDURE — 99404 PR PREVENT COUNSEL,INDIV,60 MIN: ICD-10-PCS | Mod: S$GLB,,,

## 2022-07-22 PROCEDURE — 99999 PR PBB SHADOW E&M-EST. PATIENT-LVL I: CPT | Mod: PBBFAC,,,

## 2022-07-22 PROCEDURE — 99404 PREV MED CNSL INDIV APPRX 60: CPT | Mod: S$GLB,,,

## 2022-07-22 PROCEDURE — 99999 PR PBB SHADOW E&M-EST. PATIENT-LVL I: ICD-10-PCS | Mod: PBBFAC,,,

## 2022-07-22 NOTE — PROGRESS NOTES
Individual Follow-Up Form    7/22/2022    Quit Date:     Clinical Status of Patient: Outpatient      Continuing Medication: no    Other Medications: ---     Target Symptoms: Withdrawal and medication side effects. The following were  rated moderate (3) to severe (4) on TCRS:  · Moderate (3): ----  · Severe (4): ----    Comments: Telephonic visit .  Patient continues to smoke . Patient started working on her survival bag . She bought a [employer] of [employer] bag, some puzzle books and a book to read . We will build on that  She also expresses some concern abou what if she cannot do this . Encouraged her to start thinking of quitting a small changes and doing it gradully so that it involves less anxiety . She seemed agreement with that suggestion . This week she will only keep a log of how many , when and where she smokes if possible . Using this log we will choose when and where she can focus on giving up some cigarettes slowly until she is quit.  Patient understands she can call CTTS at any time.         Diagnosis: F17.210    Next Visit: 1 week

## 2022-08-02 ENCOUNTER — TELEPHONE (OUTPATIENT)
Dept: SMOKING CESSATION | Facility: CLINIC | Age: 37
End: 2022-08-02
Payer: COMMERCIAL

## 2022-08-02 NOTE — TELEPHONE ENCOUNTER
[facility]- called patient  for  telephonic appointment. Left message to call back to reschedule [phone] or  [phone].

## 2022-08-08 ENCOUNTER — CLINICAL SUPPORT (OUTPATIENT)
Dept: SMOKING CESSATION | Facility: CLINIC | Age: 37
End: 2022-08-08

## 2022-08-08 DIAGNOSIS — F17.200 NICOTINE DEPENDENCE: Primary | ICD-10-CM

## 2022-08-08 PROCEDURE — 99999 PR PBB SHADOW E&M-EST. PATIENT-LVL I: CPT | Mod: PBBFAC,,,

## 2022-08-08 PROCEDURE — 99404 PR PREVENT COUNSEL,INDIV,60 MIN: ICD-10-PCS | Mod: S$GLB,,,

## 2022-08-08 PROCEDURE — 99404 PREV MED CNSL INDIV APPRX 60: CPT | Mod: S$GLB,,,

## 2022-08-08 PROCEDURE — 99999 PR PBB SHADOW E&M-EST. PATIENT-LVL I: ICD-10-PCS | Mod: PBBFAC,,,

## 2022-08-08 NOTE — PROGRESS NOTES
Individual Follow-Up Form    8/8/2022    Quit Date:     Clinical Status of Patient: Outpatient       Continuing Medication: no         Target Symptoms: Withdrawal and medication side effects. The following were rated moderate (3) to severe (4) on TCRS:  · Moderate (3): none  · Severe (4): none    Comments: Telephonic visit .  Patient continues to smoke . Went some day without smoking , but wasn't home on those quit days .  Discussion included the correlation of how it easier for her to go without smoking when she is away from home , Changing up routine and habits and using distraction can be key for her to be smoke free. She is staying the course and continue to work diligently on becoming smoke free. Reviewed coping strategies/habitual behavior/relapse prevention with patient.   Patient understands she can call CTTS at any time.        Diagnosis: F17.210    Next Visit: 1 week

## 2022-08-15 ENCOUNTER — CLINICAL SUPPORT (OUTPATIENT)
Dept: SMOKING CESSATION | Facility: CLINIC | Age: 37
End: 2022-08-15

## 2022-08-15 DIAGNOSIS — F17.200 NICOTINE DEPENDENCE: Primary | ICD-10-CM

## 2022-08-15 PROCEDURE — 99404 PR PREVENT COUNSEL,INDIV,60 MIN: ICD-10-PCS | Mod: S$GLB,,,

## 2022-08-15 PROCEDURE — 99999 PR PBB SHADOW E&M-EST. PATIENT-LVL I: ICD-10-PCS | Mod: PBBFAC,,,

## 2022-08-15 PROCEDURE — 99404 PREV MED CNSL INDIV APPRX 60: CPT | Mod: S$GLB,,,

## 2022-08-15 PROCEDURE — 99999 PR PBB SHADOW E&M-EST. PATIENT-LVL I: CPT | Mod: PBBFAC,,,

## 2022-08-15 NOTE — PROGRESS NOTES
Individual Follow-Up Form      Quit Date:    Clinical Status of Patient: Outpatient        Continuing Medication: no         Target Symptoms: Withdrawal and medication side effects. The following were  rated moderate (3) to severe (4) on TCRS:  · Moderate (3): none  · Severe (4): none    Comments: Telephonic visit .  Patient continues to smoke 15-20 cigarettes per day. Patient is not using any smoking cessation medication .   Reviewed coping strategies/habitual behavior/relapse prevention with patient.  Goals this week are 17cpd or less , keep them in a container , write her dear Emmanuel letter to smoking and review it daily or several times a day . Patient remains motivated and ready to quit.   Patient understands she can call CTTS at any time.    Diagnosis: F17.210    Next Visit: 1 week

## 2022-08-22 ENCOUNTER — CLINICAL SUPPORT (OUTPATIENT)
Dept: SMOKING CESSATION | Facility: CLINIC | Age: 37
End: 2022-08-22
Payer: COMMERCIAL

## 2022-08-22 DIAGNOSIS — F17.200 NICOTINE DEPENDENCE: Primary | ICD-10-CM

## 2022-08-22 PROCEDURE — 99404 PR PREVENT COUNSEL,INDIV,60 MIN: ICD-10-PCS | Mod: S$GLB,,,

## 2022-08-22 PROCEDURE — 99999 PR PBB SHADOW E&M-EST. PATIENT-LVL I: CPT | Mod: PBBFAC,,,

## 2022-08-22 PROCEDURE — 99404 PREV MED CNSL INDIV APPRX 60: CPT | Mod: S$GLB,,,

## 2022-08-22 PROCEDURE — 99999 PR PBB SHADOW E&M-EST. PATIENT-LVL I: ICD-10-PCS | Mod: PBBFAC,,,

## 2022-08-22 RX ORDER — IBUPROFEN 200 MG
1 TABLET ORAL DAILY
Qty: 14 PATCH | Refills: 0 | Status: SHIPPED | OUTPATIENT
Start: 2022-08-22 | End: 2022-09-06 | Stop reason: SDUPTHER

## 2022-08-22 RX ORDER — BUPROPION HYDROCHLORIDE 150 MG/1
TABLET, EXTENDED RELEASE ORAL
Qty: 60 TABLET | Refills: 0 | Status: SHIPPED | OUTPATIENT
Start: 2022-08-22 | End: 2022-10-03

## 2022-08-22 NOTE — PROGRESS NOTES
Individual Follow-Up Form    8/22/2022    Quit Date:     Clinical Status of Patient: Outpatient        Continuing Medication: no         Target Symptoms: Withdrawal and medication side effects. The following were  rated moderate (3) to severe (4) on TCRS:  · Moderate (3): crave/desire  · Severe (4): none    Comments: Telephonic visit .  Patient continues to smoke . She does not use any smoking cessation medications. She was able to go the good part of a day without smoking but then she became anxious and went out to buy some . She has not written her dear Emmanuel letter to smoking at this time. Today we discussed using smoking cessation medications to help .  She feels most comfortable with Wellburtrin SR and with nicotine patches . Discussed how to use patches and Wellbutrin and Divina verbalized understanding . She seems excited to move forward and understands that we have to work on behavior to help with her quit .  Patient understands she can call CTTS at any time.     Diagnosis: F17.210    Next Visit: 1 week

## 2022-08-29 ENCOUNTER — CLINICAL SUPPORT (OUTPATIENT)
Dept: SMOKING CESSATION | Facility: CLINIC | Age: 37
End: 2022-08-29

## 2022-08-29 DIAGNOSIS — F17.200 NICOTINE DEPENDENCE: Primary | ICD-10-CM

## 2022-08-29 PROCEDURE — 99404 PREV MED CNSL INDIV APPRX 60: CPT | Mod: S$GLB,,,

## 2022-08-29 PROCEDURE — 99999 PR PBB SHADOW E&M-EST. PATIENT-LVL I: ICD-10-PCS | Mod: PBBFAC,,,

## 2022-08-29 PROCEDURE — 99404 PR PREVENT COUNSEL,INDIV,60 MIN: ICD-10-PCS | Mod: S$GLB,,,

## 2022-08-29 PROCEDURE — 99999 PR PBB SHADOW E&M-EST. PATIENT-LVL I: CPT | Mod: PBBFAC,,,

## 2022-08-29 NOTE — PROGRESS NOTES
Individual Follow-Up Form    8/29/2022    Quit Date:     Clinical Status of Patient: Outpatient        Continuing Medication: yes  Wellbutrin or Patches         Target Symptoms: Withdrawal and medication side effects. The following were  rated moderate (3) to severe (4) on TCRS:  Moderate (3): ---  Severe (4): ---    Comments: Telephonic visit .  Patient continues to smoke 10 cigarettes per day. Pt remains on tobacco cessation medication of  21 mg nicotine patch QD No adverse effects noted at this time.  Discussion included writing her letter, taking Wellbutrin with no problem, problems with patches sticking , replacing her cigarette time with something pleasant to do.     Diagnosis: F17.210    Next Visit: 2 weeks

## 2022-09-06 ENCOUNTER — CLINICAL SUPPORT (OUTPATIENT)
Dept: SMOKING CESSATION | Facility: CLINIC | Age: 37
End: 2022-09-06
Payer: COMMERCIAL

## 2022-09-06 DIAGNOSIS — F17.200 NICOTINE DEPENDENCE: ICD-10-CM

## 2022-09-06 PROCEDURE — 99999 PR PBB SHADOW E&M-EST. PATIENT-LVL I: CPT | Mod: PBBFAC,,,

## 2022-09-06 PROCEDURE — 99404 PR PREVENT COUNSEL,INDIV,60 MIN: ICD-10-PCS | Mod: ,,,

## 2022-09-06 PROCEDURE — 99999 PR PBB SHADOW E&M-EST. PATIENT-LVL I: ICD-10-PCS | Mod: PBBFAC,,,

## 2022-09-06 PROCEDURE — 99404 PREV MED CNSL INDIV APPRX 60: CPT | Mod: ,,,

## 2022-09-06 RX ORDER — IBUPROFEN 200 MG
1 TABLET ORAL DAILY
Qty: 28 PATCH | Refills: 0 | Status: SHIPPED | OUTPATIENT
Start: 2022-09-06 | End: 2023-01-23

## 2022-09-07 NOTE — PROGRESS NOTES
Individual Follow-Up Form    9/6/22    Quit Date:     Clinical Status of Patient: Outpatient        Continuing Medication: yes  Wellbutrin or Patches    Other Medications: ---     Target Symptoms: Withdrawal and medication side effects. The following were  rated moderate (3) to severe (4) on TCRS:  Moderate (3): ---  Severe (4): ---    Comments: Patient presents for follow up smoking, she has not smoked today . Pt remains on tobacco cessation medication of 21 mg nicotine patch QD and 150 mg Wellbutrin SR BID.  Pt doing well with rate reduction and wait times prior to smoking.  Pt's exhaled carbon monoxide level was  0 ppm as per Smoker[employer]zer. (non- smoker = 0-5 ppm.)  Reviewed coping strategies/habitual behavior/relapse prevention with patient. Patient did write her deatomas Benitez letter . She is excited and very motivated to quit . Provided patient with a bag of distraction gadgets and cinnamon toothpicks.  Patient understands he can call CTTS at any time.      Diagnosis: F17.210    Next Visit: 1 week

## 2022-10-03 ENCOUNTER — CLINICAL SUPPORT (OUTPATIENT)
Dept: SMOKING CESSATION | Facility: CLINIC | Age: 37
End: 2022-10-03

## 2022-10-03 DIAGNOSIS — F17.200 NICOTINE DEPENDENCE: Primary | ICD-10-CM

## 2022-10-03 PROCEDURE — 99404 PR PREVENT COUNSEL,INDIV,60 MIN: ICD-10-PCS | Mod: S$GLB,,,

## 2022-10-03 PROCEDURE — 99999 PR PBB SHADOW E&M-EST. PATIENT-LVL II: CPT | Mod: PBBFAC,,,

## 2022-10-03 PROCEDURE — 99404 PREV MED CNSL INDIV APPRX 60: CPT | Mod: S$GLB,,,

## 2022-10-03 PROCEDURE — 99999 PR PBB SHADOW E&M-EST. PATIENT-LVL II: ICD-10-PCS | Mod: PBBFAC,,,

## 2022-10-03 NOTE — PROGRESS NOTES
Individual Follow-Up Form    10/3/2022    Quit Date:     Clinical Status of Patient: Outpatient        Continuing Medication: no         Target Symptoms: Withdrawal and medication side effects. The following were  rated moderate (3) to severe (4) on TCRS:  Moderate (3): --  Severe (4): ---    Comments:  Patient continues to smoke but went two days quit !  Patient is no longer using any smoking cessation medications and no longer wishes to . New goals are replacing the smoking time with time spent with her son , funny movie , games , etc. Also to have desert with her son .  Patient understands she can call CTTS at any time.      Diagnosis: F17.210    Next Visit: 2 weeks

## 2022-10-10 ENCOUNTER — CLINICAL SUPPORT (OUTPATIENT)
Dept: SMOKING CESSATION | Facility: CLINIC | Age: 37
End: 2022-10-10

## 2022-10-10 DIAGNOSIS — F17.200 NICOTINE DEPENDENCE: Primary | ICD-10-CM

## 2022-10-10 PROCEDURE — 99999 PR PBB SHADOW E&M-EST. PATIENT-LVL I: ICD-10-PCS | Mod: PBBFAC,,,

## 2022-10-10 PROCEDURE — 90853 PR GROUP PSYCHOTHERAPY: ICD-10-PCS | Mod: S$GLB,,,

## 2022-10-10 PROCEDURE — 90853 GROUP PSYCHOTHERAPY: CPT | Mod: S$GLB,,,

## 2022-10-10 PROCEDURE — 99999 PR PBB SHADOW E&M-EST. PATIENT-LVL I: CPT | Mod: PBBFAC,,,

## 2022-10-10 NOTE — PROGRESS NOTES
Individual Follow-Up Form    10/10/2022    Quit Date:     Clinical Status of Patient: Outpatient        Continuing Medication: no         Target Symptoms: Withdrawal and medication side effects. The following were  rated moderate (3) to severe (4) on TCRS:  Moderate (3): ----  Severe (4): -----    Comments: Telephonic visit .  Patient continues to smoke . Patient does not wish to use any smoking cessation medications. Discussion included : distractions , routines , wiliness and readiness  to change .  Reviewed coping strategies/habitual behavior/relapse prevention with patient. Patient understands she can call CTTS at any time.         Diagnosis: F17.200    Next Visit: 2 weeks

## 2022-10-17 ENCOUNTER — TELEPHONE (OUTPATIENT)
Dept: SMOKING CESSATION | Facility: CLINIC | Age: 37
End: 2022-10-17
Payer: COMMERCIAL

## 2022-10-17 NOTE — TELEPHONE ENCOUNTER
[facility]- called patient  for  telephonic appointment. Left message to call back to reschedule [phone] or  [phone].

## 2022-10-26 ENCOUNTER — CLINICAL SUPPORT (OUTPATIENT)
Dept: SMOKING CESSATION | Facility: CLINIC | Age: 37
End: 2022-10-26

## 2022-10-26 DIAGNOSIS — F17.200 NICOTINE DEPENDENCE: Primary | ICD-10-CM

## 2022-10-26 PROCEDURE — 99407 PR TOBACCO USE CESSATION INTENSIVE >10 MINUTES: ICD-10-PCS | Mod: S$GLB,,,

## 2022-10-26 PROCEDURE — 99999 PR PBB SHADOW E&M-EST. PATIENT-LVL I: ICD-10-PCS | Mod: PBBFAC,,,

## 2022-10-26 PROCEDURE — 99407 BEHAV CHNG SMOKING > 10 MIN: CPT | Mod: S$GLB,,,

## 2022-10-26 PROCEDURE — 99999 PR PBB SHADOW E&M-EST. PATIENT-LVL I: CPT | Mod: PBBFAC,,,

## 2022-10-26 NOTE — PROGRESS NOTES
Spoke with patient today in regard to smoking cessation progress for 3 month telephone follow up, she states not tobacco free. Patient is currently enrolled in the program with scheduled follow up appointments. Informed patient of benefit period, future follow ups, and contact information if any further help or support is needed. Will complete smart form for 3 month follow up on Quit attempt #2.

## 2022-11-07 ENCOUNTER — CLINICAL SUPPORT (OUTPATIENT)
Dept: SMOKING CESSATION | Facility: CLINIC | Age: 37
End: 2022-11-07

## 2022-11-07 DIAGNOSIS — F17.200 NICOTINE DEPENDENCE: Primary | ICD-10-CM

## 2022-11-07 PROCEDURE — 99404 PR PREVENT COUNSEL,INDIV,60 MIN: ICD-10-PCS | Mod: S$GLB,,,

## 2022-11-07 PROCEDURE — 99999 PR PBB SHADOW E&M-EST. PATIENT-LVL II: ICD-10-PCS | Mod: PBBFAC,,,

## 2022-11-07 PROCEDURE — 99999 PR PBB SHADOW E&M-EST. PATIENT-LVL II: CPT | Mod: PBBFAC,,,

## 2022-11-07 PROCEDURE — 99404 PREV MED CNSL INDIV APPRX 60: CPT | Mod: S$GLB,,,

## 2022-11-07 NOTE — PROGRESS NOTES
---Individual Follow-Up Form    11/7/2022    Quit Date: 11/4/22    Clinical Status of Patient: Outpatient        Continuing Medication: no         Target Symptoms: Withdrawal and medication side effects. The following were  rated moderate (3) to severe (4) on TCRS:  Moderate (3): ---  Severe (4): ---      Comments: Patient has been quit for 4 days , her Mom is visiting her this week . Discussion included distraction , time line improved health changes after a quit, withdrawal issues to expect from nicotine and habit, and ways to achieve the goal of a quit. Patient is very motivated to stay quit .   Patient understands she can call CTTS at any time.          Diagnosis: F17.210    Next Visit: 2 weeks

## 2022-11-08 ENCOUNTER — IMMUNIZATION (OUTPATIENT)
Dept: PRIMARY CARE CLINIC | Facility: CLINIC | Age: 37
End: 2022-11-08
Payer: COMMERCIAL

## 2022-11-08 DIAGNOSIS — Z23 NEED FOR VACCINATION: Primary | ICD-10-CM

## 2022-11-08 PROCEDURE — 91312 COVID-19, MRNA, LNP-S, BIVALENT BOOSTER, PF, 30 MCG/0.3 ML DOSE: CPT | Mod: PBBFAC | Performed by: INTERNAL MEDICINE

## 2022-11-08 PROCEDURE — 0124A COVID-19, MRNA, LNP-S, BIVALENT BOOSTER, PF, 30 MCG/0.3 ML DOSE: CPT | Mod: CV19,PBBFAC | Performed by: INTERNAL MEDICINE

## 2022-11-14 ENCOUNTER — TELEPHONE (OUTPATIENT)
Dept: SMOKING CESSATION | Facility: CLINIC | Age: 37
End: 2022-11-14
Payer: COMMERCIAL

## 2022-11-14 NOTE — TELEPHONE ENCOUNTER
[facility]- called patient  for  telephonic appointment. Left message to call back to reschedule [phone] or  [phone].

## 2022-11-21 ENCOUNTER — CLINICAL SUPPORT (OUTPATIENT)
Dept: SMOKING CESSATION | Facility: CLINIC | Age: 37
End: 2022-11-21

## 2022-11-21 DIAGNOSIS — F17.200 NICOTINE DEPENDENCE: Primary | ICD-10-CM

## 2022-11-21 PROCEDURE — 99999 PR PBB SHADOW E&M-EST. PATIENT-LVL II: CPT | Mod: PBBFAC,,,

## 2022-11-21 PROCEDURE — 99404 PREV MED CNSL INDIV APPRX 60: CPT | Mod: S$GLB,,,

## 2022-11-21 PROCEDURE — 99999 PR PBB SHADOW E&M-EST. PATIENT-LVL II: ICD-10-PCS | Mod: PBBFAC,,,

## 2022-11-21 PROCEDURE — 99404 PR PREVENT COUNSEL,INDIV,60 MIN: ICD-10-PCS | Mod: S$GLB,,,

## 2022-11-21 NOTE — PROGRESS NOTES
Individual Follow-Up Form    11/21/2022    Quit Date:     Clinical Status of Patient: Outpatient      Continuing Medication: no         Target Symptoms: Withdrawal and medication side effects. The following were rated moderate (3) to severe (4) on TCRS:  Moderate (3): ---  Severe (4): ---    Comments: Telephonic visit .  Patient continues to smoke . Discussion included keeping busy during the holidays , planning where to attend non-smoking events so smoking will not be an option , holiday obstacles to look out for .  Reviewed coping strategies/habitual behavior/relapse prevention with patient.          Diagnosis: F17.210    Next Visit: 2 weeks

## 2022-11-28 ENCOUNTER — CLINICAL SUPPORT (OUTPATIENT)
Dept: SMOKING CESSATION | Facility: CLINIC | Age: 37
End: 2022-11-28

## 2022-11-28 DIAGNOSIS — F17.200 NICOTINE DEPENDENCE: Primary | ICD-10-CM

## 2022-11-28 PROCEDURE — 99404 PREV MED CNSL INDIV APPRX 60: CPT | Mod: S$GLB,,,

## 2022-11-28 PROCEDURE — 99999 PR PBB SHADOW E&M-EST. PATIENT-LVL II: CPT | Mod: PBBFAC,,,

## 2022-11-28 PROCEDURE — 99404 PR PREVENT COUNSEL,INDIV,60 MIN: ICD-10-PCS | Mod: S$GLB,,,

## 2022-11-28 PROCEDURE — 99999 PR PBB SHADOW E&M-EST. PATIENT-LVL II: ICD-10-PCS | Mod: PBBFAC,,,

## 2022-11-28 NOTE — PROGRESS NOTES
Individual Follow-Up Form    11/28/2022    Quit Date:     Clinical Status of Patient: Outpatient        Continuing Medication: no         Target Symptoms: Withdrawal and medication side effects. The following were  rated moderate (3) to severe (4) on TCRS:  Moderate (3): ---  Severe (4): ----    Comments: Telephonic visit .  Patient continues to smoke .  Discussion included preparing for her quit day .  She is interested in using a mantra / mindfulness as a quit tool. Encouraged her to . She selected her mantra and she will use it several times a day . Reviewed strategies, habitual behavior, high risks situations, understanding urges and cravings, stress and relaxation with open discussion and additional interventions,  Patient is motivated and ready to quit . Patient understands she can call CTTS at any time.    Diagnosis: F17.210    Next Visit: 2 weeks

## 2022-12-05 ENCOUNTER — CLINICAL SUPPORT (OUTPATIENT)
Dept: SMOKING CESSATION | Facility: CLINIC | Age: 37
End: 2022-12-05

## 2022-12-05 DIAGNOSIS — F17.200 NICOTINE DEPENDENCE: Primary | ICD-10-CM

## 2022-12-05 PROCEDURE — 99999 PR PBB SHADOW E&M-EST. PATIENT-LVL I: CPT | Mod: PBBFAC,,,

## 2022-12-05 PROCEDURE — 99404 PR PREVENT COUNSEL,INDIV,60 MIN: ICD-10-PCS | Mod: S$GLB,,,

## 2022-12-05 PROCEDURE — 99999 PR PBB SHADOW E&M-EST. PATIENT-LVL I: ICD-10-PCS | Mod: PBBFAC,,,

## 2022-12-05 PROCEDURE — 99404 PREV MED CNSL INDIV APPRX 60: CPT | Mod: S$GLB,,,

## 2022-12-05 NOTE — PROGRESS NOTES
Individual Follow-Up Form    12/5/2022    Quit Date:     Clinical Status of Patient: Outpatient        Continuing Medication: no         Target Symptoms: Withdrawal and medication side effects. The following were  rated moderate (3) to severe (4) on TCRS:  Moderate (3): ---  Severe (4): ---    Comments: Patient remains smoking . Divina does not use any smoking cessation medications. Discussion included : Preparing for her quit day , having less cigarettes on her until she does quit , filling that space with something new . She will be out of town for [place] and feels that will help her with her quit .  Patient understands she can call CTTS at any time.      Diagnosis: F17.210    Next Visit: 1 week

## 2022-12-12 ENCOUNTER — CLINICAL SUPPORT (OUTPATIENT)
Dept: SMOKING CESSATION | Facility: CLINIC | Age: 37
End: 2022-12-12

## 2022-12-12 DIAGNOSIS — F17.200 NICOTINE DEPENDENCE: Primary | ICD-10-CM

## 2022-12-12 PROCEDURE — 99404 PREV MED CNSL INDIV APPRX 60: CPT | Mod: S$GLB,,,

## 2022-12-12 PROCEDURE — 99404 PR PREVENT COUNSEL,INDIV,60 MIN: ICD-10-PCS | Mod: S$GLB,,,

## 2022-12-12 PROCEDURE — 99999 PR PBB SHADOW E&M-EST. PATIENT-LVL I: ICD-10-PCS | Mod: PBBFAC,,,

## 2022-12-12 PROCEDURE — 99999 PR PBB SHADOW E&M-EST. PATIENT-LVL I: CPT | Mod: PBBFAC,,,

## 2022-12-12 NOTE — PROGRESS NOTES
Individual Follow-Up Form    12/12/2022    Quit Date:     Clinical Status of Patient: Outpatient        Continuing Medication: no         Target Symptoms: Withdrawal and medication side effects. The following were  rated moderate (3) to severe (4) on TCRS:  Moderate (3): ----  Severe (4): ---     Comments: Telephonic visit .  Patient continues to smoke .Planned quit day is 12/17/22, she will be out of town visiting family and she plans on returning home a non-smoker . She   moved table the table where she smokes , put potted plants in her smoke area, cleaned area , and did some no smoking breaks.  Discussed how to prepare for the day , and how to prepare when she comes back home . Divina is motivated to quit and I think she will do very well .  Patient understands she can call CTTS at any time.        Diagnosis: F17.200    Next Visit: 2 weeks

## 2022-12-19 ENCOUNTER — CLINICAL SUPPORT (OUTPATIENT)
Dept: SMOKING CESSATION | Facility: CLINIC | Age: 37
End: 2022-12-19
Payer: COMMERCIAL

## 2022-12-19 DIAGNOSIS — F17.200 NICOTINE DEPENDENCE: Primary | ICD-10-CM

## 2022-12-19 PROCEDURE — 99999 PR PBB SHADOW E&M-EST. PATIENT-LVL I: CPT | Mod: PBBFAC,,,

## 2022-12-19 PROCEDURE — 99407 BEHAV CHNG SMOKING > 10 MIN: CPT | Mod: S$PBB,,,

## 2022-12-19 PROCEDURE — 99407 PR TOBACCO USE CESSATION INTENSIVE >10 MINUTES: ICD-10-PCS | Mod: S$PBB,,,

## 2022-12-19 PROCEDURE — 99999 PR PBB SHADOW E&M-EST. PATIENT-LVL I: ICD-10-PCS | Mod: PBBFAC,,,

## 2023-01-03 ENCOUNTER — CLINICAL SUPPORT (OUTPATIENT)
Dept: SMOKING CESSATION | Facility: CLINIC | Age: 38
End: 2023-01-03
Payer: COMMERCIAL

## 2023-01-03 DIAGNOSIS — F17.200 NICOTINE DEPENDENCE: Primary | ICD-10-CM

## 2023-01-03 PROCEDURE — 99402 PR PREVENT COUNSEL,INDIV,30 MIN: ICD-10-PCS | Mod: S$PBB,,,

## 2023-01-03 PROCEDURE — 99999 PR PBB SHADOW E&M-EST. PATIENT-LVL I: CPT | Mod: PBBFAC,,,

## 2023-01-03 PROCEDURE — 99999 PR PBB SHADOW E&M-EST. PATIENT-LVL I: ICD-10-PCS | Mod: PBBFAC,,,

## 2023-01-03 PROCEDURE — 99402 PREV MED CNSL INDIV APPRX 30: CPT | Mod: S$PBB,,,

## 2023-01-04 NOTE — PROGRESS NOTES
IIndividual Follow-Up Form    1/3/23    Quit Date:     Clinical Status of Patient: Outpatient      Continuing Medication: no    Other Medications: ----   Target Symptoms: Withdrawal and medication side effects. The following were  rated moderate (3) to severe (4) on TCRS:  Moderate (3): crave/desire   Severe (4): none    Comments: Telephonic visit .  Patient continues to smoke .  She does not use any smoking cessation meds and she does not desire to . She was able to quit for a week while she was out of town but when she returned home she desired to smoke again and she bought some cigarettes.  Encouraged patient to not give up and get back on track for her quit .  Patient understands she can call CTTS at any time.        Diagnosis: F17.210    Next Visit: 1 week

## 2023-01-17 ENCOUNTER — CLINICAL SUPPORT (OUTPATIENT)
Dept: SMOKING CESSATION | Facility: CLINIC | Age: 38
End: 2023-01-17
Payer: COMMERCIAL

## 2023-01-17 DIAGNOSIS — F17.200 NICOTINE DEPENDENCE: Primary | ICD-10-CM

## 2023-01-17 PROCEDURE — 99999 PR PBB SHADOW E&M-EST. PATIENT-LVL I: ICD-10-PCS | Mod: PBBFAC,,,

## 2023-01-17 PROCEDURE — 99407 PR TOBACCO USE CESSATION INTENSIVE >10 MINUTES: ICD-10-PCS | Mod: S$GLB,,, | Performed by: FAMILY MEDICINE

## 2023-01-17 PROCEDURE — 99407 BEHAV CHNG SMOKING > 10 MIN: CPT | Mod: S$GLB,,, | Performed by: FAMILY MEDICINE

## 2023-01-17 PROCEDURE — 99999 PR PBB SHADOW E&M-EST. PATIENT-LVL I: CPT | Mod: PBBFAC,,,

## 2023-01-17 NOTE — PROGRESS NOTES
Spoke with patient today in regard to smoking cessation progress for 6 mo telephone f/u, she states not yet tobacco free but still in the program. Informed patient of benefit period and contact information if any further help or support is needed. Will complete smart form for Quit attempt #1.

## 2023-01-23 ENCOUNTER — CLINICAL SUPPORT (OUTPATIENT)
Dept: SMOKING CESSATION | Facility: CLINIC | Age: 38
End: 2023-01-23

## 2023-01-23 DIAGNOSIS — F17.200 NICOTINE DEPENDENCE: Primary | ICD-10-CM

## 2023-01-23 PROCEDURE — 99404 PR PREVENT COUNSEL,INDIV,60 MIN: ICD-10-PCS | Mod: S$GLB,,,

## 2023-01-23 PROCEDURE — 99404 PREV MED CNSL INDIV APPRX 60: CPT | Mod: S$GLB,,,

## 2023-01-23 PROCEDURE — 99999 PR PBB SHADOW E&M-EST. PATIENT-LVL I: ICD-10-PCS | Mod: PBBFAC,,,

## 2023-01-23 PROCEDURE — 99999 PR PBB SHADOW E&M-EST. PATIENT-LVL I: CPT | Mod: PBBFAC,,,

## 2023-01-23 NOTE — PROGRESS NOTES
Individual Follow-Up Form    1/23/2023    Quit Date:     Clinical Status of Patient: Outpatient        Continuing Medication: no         Target Symptoms: Withdrawal and medication side effects. The following were  rated moderate (3) to severe (4) on TCRS:  Moderate (3): crave/desire  Severe (4): none    Comments: Telephonic visit .  Telephonic visit .  Patient continues to smoke . Patient did quit while she was out of town but unfortunately she returned to smoking when she came back home . Discussion included her reasons for wanting to quit , health problems associated with smoking , her awesome son and why she wants to not be a smoker for him . Discussed plans on how to achieve this .   Reviewed with patient ,strategies, cues, and triggers, the negative impact of tobacco on health, the health advantages of discontinuing the use of tobacco, time line improved health changes after a quit, withdrawal issues to expect from nicotine and habit, and ways to achieve the goal of a quit.  She remains motivated to quit and she will ! Patient understands she can call CTTS at any time.        Diagnosis: F17.210    Next Visit: 1 week

## 2023-01-30 ENCOUNTER — CLINICAL SUPPORT (OUTPATIENT)
Dept: SMOKING CESSATION | Facility: CLINIC | Age: 38
End: 2023-01-30

## 2023-01-30 DIAGNOSIS — F17.200 NICOTINE DEPENDENCE: Primary | ICD-10-CM

## 2023-01-30 PROCEDURE — 99404 PREV MED CNSL INDIV APPRX 60: CPT | Mod: S$GLB,,,

## 2023-01-30 PROCEDURE — 99404 PR PREVENT COUNSEL,INDIV,60 MIN: ICD-10-PCS | Mod: S$GLB,,,

## 2023-01-30 PROCEDURE — 99999 PR PBB SHADOW E&M-EST. PATIENT-LVL I: CPT | Mod: PBBFAC,,,

## 2023-01-30 PROCEDURE — 99999 PR PBB SHADOW E&M-EST. PATIENT-LVL I: ICD-10-PCS | Mod: PBBFAC,,,

## 2023-01-30 NOTE — PROGRESS NOTES
Individual Follow-Up Form    1/30/2023    Quit Date:     Clinical Status of Patient: Outpatient        Continuing Medication: no         Target Symptoms: Withdrawal and medication side effects. The following were  rated moderate (3) to severe (4) on TCRS:  Moderate (3): none  Severe (4): none    Comments: Telephonic visit .  Patient continues to smoke .  She has not had a cigarette single last night so she is planing for today to be her quit day . Commended her on that decision . Discussed using distractions and rewarding herself during this time. She is walking while talking to me and she is excited to improve her lung function because of quitting .  Reviewed coping strategies/habitual behavior/relapse prevention with patient.  Patient understands she can call CTTS at any time.          Diagnosis: F17.210    Next Visit: 2 weeks

## 2023-02-13 ENCOUNTER — CLINICAL SUPPORT (OUTPATIENT)
Dept: SMOKING CESSATION | Facility: CLINIC | Age: 38
End: 2023-02-13

## 2023-02-13 DIAGNOSIS — F17.200 NICOTINE DEPENDENCE: Primary | ICD-10-CM

## 2023-02-13 PROCEDURE — 90853 PR GROUP PSYCHOTHERAPY: ICD-10-PCS | Mod: S$GLB,,,

## 2023-02-13 PROCEDURE — 99999 PR PBB SHADOW E&M-EST. PATIENT-LVL I: CPT | Mod: PBBFAC,,,

## 2023-02-13 PROCEDURE — 90853 GROUP PSYCHOTHERAPY: CPT | Mod: S$GLB,,,

## 2023-02-13 PROCEDURE — 99999 PR PBB SHADOW E&M-EST. PATIENT-LVL I: ICD-10-PCS | Mod: PBBFAC,,,

## 2023-02-13 NOTE — PROGRESS NOTES
Individual Follow-Up Form    2/13/2023    Quit Date:     Clinical Status of Patient: Outpatient        Continuing Medication: no         Target Symptoms: Withdrawal and medication side effects. The following were  rated moderate (3) to severe (4) on TCRS:  Moderate (3): ----  Severe (4): ---    Comments: Patient has smoked some days and some days she has not , she remains motivated and ready to quit , just working on giving in to crave .  Reviewed coping strategies/habitual behavior/relapse prevention with patient. Patient is not using any smoking cessation medications ,  she does not chose to .  Patient understands she can call CTTS at any time.         Diagnosis: F17.210    Next Visit: 2 weeks

## 2023-04-14 ENCOUNTER — OFFICE VISIT (OUTPATIENT)
Dept: PRIMARY CARE CLINIC | Facility: CLINIC | Age: 38
End: 2023-04-14
Payer: COMMERCIAL

## 2023-04-14 DIAGNOSIS — F41.8 DEPRESSION WITH ANXIETY: Primary | ICD-10-CM

## 2023-04-14 PROCEDURE — 1160F PR REVIEW ALL MEDS BY PRESCRIBER/CLIN PHARMACIST DOCUMENTED: ICD-10-PCS | Mod: CPTII,95,, | Performed by: FAMILY MEDICINE

## 2023-04-14 PROCEDURE — 1160F RVW MEDS BY RX/DR IN RCRD: CPT | Mod: CPTII,95,, | Performed by: FAMILY MEDICINE

## 2023-04-14 PROCEDURE — 99203 OFFICE O/P NEW LOW 30 MIN: CPT | Mod: 95,,, | Performed by: FAMILY MEDICINE

## 2023-04-14 PROCEDURE — 99203 PR OFFICE/OUTPT VISIT, NEW, LEVL III, 30-44 MIN: ICD-10-PCS | Mod: 95,,, | Performed by: FAMILY MEDICINE

## 2023-04-14 PROCEDURE — 1159F PR MEDICATION LIST DOCUMENTED IN MEDICAL RECORD: ICD-10-PCS | Mod: CPTII,95,, | Performed by: FAMILY MEDICINE

## 2023-04-14 PROCEDURE — 1159F MED LIST DOCD IN RCRD: CPT | Mod: CPTII,95,, | Performed by: FAMILY MEDICINE

## 2023-04-14 RX ORDER — BUPROPION HYDROCHLORIDE 150 MG/1
150 TABLET ORAL DAILY
Qty: 30 TABLET | Refills: 1 | Status: SHIPPED | OUTPATIENT
Start: 2023-04-14 | End: 2023-05-23 | Stop reason: SDUPTHER

## 2023-04-14 RX ORDER — HYDROXYZINE PAMOATE 25 MG/1
25 CAPSULE ORAL 3 TIMES DAILY PRN
Qty: 90 CAPSULE | Refills: 0 | Status: SHIPPED | OUTPATIENT
Start: 2023-04-14

## 2023-04-14 NOTE — PROGRESS NOTES
The patient location is: [place]  The chief complaint leading to consultation is:  Depression and anxiety    Visit type: audiovisual    Face to Face time with patient:   22 minutes of total time spent on the encounter, which includes face to face time and non-face to face time preparing to see the patient (eg, review of tests), Obtaining and/or reviewing separately obtained history, Documenting clinical information in the electronic or other health record, Independently interpreting results (not separately reported) and communicating results to the patient/family/caregiver, or Care coordination (not separately reported).         Each patient to whom he or she provides medical services by telemedicine is:  (1) informed of the relationship between the physician and patient and the respective role of any other health care provider with respect to management of the patient; and (2) notified that he or she may decline to receive medical services by telemedicine and may withdraw from such care at any time.    Notes:    HPI:  Patient presents with depressed mood, low motivation and generalized worry for 9 years.  Constantly thinking about things she has to do and has trouble sleeping. She moved to [place] 9 years ago, had a child and is currently  from child's  father.  She feels like she does have a support system here and she is hardly gets to see her family back home.  She is also concerned about recently suspected mold in her home.    ROS:  Denies SI, hallucinations or manic spells.  No crying spells.  Denies cough or allergy symptoms.    Diagnoses and all orders for this visit:    Depression with anxiety  Patient with chronic untreated situational depression and anxiety mostly related to family dynamics.  -     buPROPion (WELLBUTRIN XL) 150 MG TB24 tablet; Take 1 tablet (150 mg total) by mouth once daily.  -     hydrOXYzine pamoate (VISTARIL) 25 MG Cap; Take 1 capsule (25 mg total) by mouth 3 (three) times  daily as needed.

## 2023-05-20 DIAGNOSIS — F41.8 DEPRESSION WITH ANXIETY: ICD-10-CM

## 2023-05-20 NOTE — TELEPHONE ENCOUNTER
Refill Routing Note   Medication(s) are not appropriate for processing by [facility] for the following reason(s):      Responsible provider unclear    [facility] action(s):  Route None identified            Appointments  past 12m or future 3m with PCP    Date Provider   Last Visit   4/14/2023 Maya Bynum MD   Next Visit   Visit date not found Maya Bynum MD   ED visits in past 90 days: 0        Note composed:4:36 PM 05/20/2023

## 2023-05-23 RX ORDER — BUPROPION HYDROCHLORIDE 150 MG/1
TABLET ORAL
Qty: 30 TABLET | Refills: 1 | Status: SHIPPED | OUTPATIENT
Start: 2023-05-23

## 2023-05-30 ENCOUNTER — OFFICE VISIT (OUTPATIENT)
Dept: OBSTETRICS AND GYNECOLOGY | Facility: CLINIC | Age: 38
End: 2023-05-30
Payer: COMMERCIAL

## 2023-05-30 VITALS — DIASTOLIC BLOOD PRESSURE: 74 MMHG | BODY MASS INDEX: 31.1 KG/M2 | SYSTOLIC BLOOD PRESSURE: 140 MMHG | WEIGHT: 192.69 LBS

## 2023-05-30 DIAGNOSIS — Z12.4 PAP SMEAR FOR CERVICAL CANCER SCREENING: ICD-10-CM

## 2023-05-30 DIAGNOSIS — Z12.31 SCREENING MAMMOGRAM FOR BREAST CANCER: ICD-10-CM

## 2023-05-30 DIAGNOSIS — Z01.419 ENCOUNTER FOR ANNUAL ROUTINE GYNECOLOGICAL EXAMINATION: Primary | ICD-10-CM

## 2023-05-30 DIAGNOSIS — F32.A DEPRESSION, UNSPECIFIED DEPRESSION TYPE: ICD-10-CM

## 2023-05-30 DIAGNOSIS — Z11.3 SCREENING EXAMINATION FOR STD (SEXUALLY TRANSMITTED DISEASE): ICD-10-CM

## 2023-05-30 PROCEDURE — 3077F SYST BP >= 140 MM HG: CPT | Mod: CPTII,S$GLB,, | Performed by: OBSTETRICS & GYNECOLOGY

## 2023-05-30 PROCEDURE — 1159F PR MEDICATION LIST DOCUMENTED IN MEDICAL RECORD: ICD-10-PCS | Mod: CPTII,S$GLB,, | Performed by: OBSTETRICS & GYNECOLOGY

## 2023-05-30 PROCEDURE — 3078F DIAST BP <80 MM HG: CPT | Mod: CPTII,S$GLB,, | Performed by: OBSTETRICS & GYNECOLOGY

## 2023-05-30 PROCEDURE — 87624 HPV HI-RISK TYP POOLED RSLT: CPT | Performed by: OBSTETRICS & GYNECOLOGY

## 2023-05-30 PROCEDURE — 99999 PR PBB SHADOW E&M-EST. PATIENT-LVL III: ICD-10-PCS | Mod: PBBFAC,,, | Performed by: OBSTETRICS & GYNECOLOGY

## 2023-05-30 PROCEDURE — 99999 PR PBB SHADOW E&M-EST. PATIENT-LVL III: CPT | Mod: PBBFAC,,, | Performed by: OBSTETRICS & GYNECOLOGY

## 2023-05-30 PROCEDURE — 3008F BODY MASS INDEX DOCD: CPT | Mod: CPTII,S$GLB,, | Performed by: OBSTETRICS & GYNECOLOGY

## 2023-05-30 PROCEDURE — 3008F PR BODY MASS INDEX (BMI) DOCUMENTED: ICD-10-PCS | Mod: CPTII,S$GLB,, | Performed by: OBSTETRICS & GYNECOLOGY

## 2023-05-30 PROCEDURE — 3077F PR MOST RECENT SYSTOLIC BLOOD PRESSURE >= 140 MM HG: ICD-10-PCS | Mod: CPTII,S$GLB,, | Performed by: OBSTETRICS & GYNECOLOGY

## 2023-05-30 PROCEDURE — 3078F PR MOST RECENT DIASTOLIC BLOOD PRESSURE < 80 MM HG: ICD-10-PCS | Mod: CPTII,S$GLB,, | Performed by: OBSTETRICS & GYNECOLOGY

## 2023-05-30 PROCEDURE — 81514 NFCT DS BV&VAGINITIS DNA ALG: CPT | Performed by: OBSTETRICS & GYNECOLOGY

## 2023-05-30 PROCEDURE — 99396 PR PREVENTIVE VISIT,EST,40-64: ICD-10-PCS | Mod: S$GLB,,, | Performed by: OBSTETRICS & GYNECOLOGY

## 2023-05-30 PROCEDURE — 88175 CYTOPATH C/V AUTO FLUID REDO: CPT | Performed by: OBSTETRICS & GYNECOLOGY

## 2023-05-30 PROCEDURE — 1159F MED LIST DOCD IN RCRD: CPT | Mod: CPTII,S$GLB,, | Performed by: OBSTETRICS & GYNECOLOGY

## 2023-05-30 PROCEDURE — 99396 PREV VISIT EST AGE 40-64: CPT | Mod: S$GLB,,, | Performed by: OBSTETRICS & GYNECOLOGY

## 2023-05-30 PROCEDURE — 87591 N.GONORRHOEAE DNA AMP PROB: CPT | Performed by: OBSTETRICS & GYNECOLOGY

## 2023-05-30 NOTE — PROGRESS NOTES
"  Chief Complaint   Patient presents with    Annual Exam              HISTORY OF PRESENT ILLNESS:   Divina Salmeron is a 38 y.o. female  who presents for well woman exam.  Patient's last menstrual period was 2023 (exact date)..  She reports the vaginal bumps are the same. Cycles are regular. Desires STD testing.  She started SNRI and would like to see a therapist.      Past Medical History:   Diagnosis Date    Oral herpes         History reviewed. No pertinent surgical history.      Social History     Socioeconomic History    Marital status: Single     Spouse name: Warren Ramos   Occupational History    Occupation: garden center      Employer: [employer]   Tobacco Use    Smoking status: Every Day     Packs/day: 1.00     Types: Cigarettes     Start date:     Smokeless tobacco: Never   Substance and Sexual Activity    Alcohol use: Yes     Comment: not with pregnancy    Drug use: No     Types: Marijuana, Other-see comments    Sexual activity: Not Currently     Partners: Male   Social History Narrative    Pt: worked at "[employer]" likes outdoors    Partner: Warren - works at  [employer], bipolar OCD, heart disorder    Son: Josef       Family History   Problem Relation Age of Onset    No Known Problems Paternal Grandfather     Hypertension Father     Diabetes Mother     Hypertension Mother     Breast cancer Paternal Aunt     Cancer Other          OB History    Para Term  AB Living   3 1 1   2 1   SAB IAB Ectopic Multiple Live Births     2   0 1      # Outcome Date GA Lbr Chris/2nd Weight Sex Delivery Anes PTL Lv   3 Term 02/27/15 41w2d / 00:12 4.252 kg (9 lb 6 oz) M Vag-Spont Local N LUTHER   2 IAB  7w0d          1 IAB  8w0d                COMPREHENSIVE GYN HISTORY:  PAP History:  NILM/HPV16 and non 16/18 +, no colpo; NILM/HPV +, no colpo   Infection History: had genital warts in the past. Denies PID.  Benign History:Denies uterine fibroids. Denies ovarian cysts. Denies endometriosis " Denies other conditions.  Cancer History: Denies cervical cancer. Denies uterine cancer or hyperplasia. Denies ovarian cancer. Denies vulvar cancer or pre-cancer. Denies vaginal cancer or pre-cancer. Denies breast cancer. Denies colon cancer.  Cycle: 13/mon/3-4  Contraception: condoms     ROS:  Negative     BP (!) 140/74   Wt 87.4 kg (192 lb 10.9 oz)   LMP 05/06/2023 (Exact Date)   BMI 31.10 kg/m²     APPEARANCE: Well nourished, well developed, in no acute distress.  NECK: Neck symmetric   ABDOMEN: Soft. No tenderness or masses. No hernias. No hepatosplenomegaly.  BREASTS: Symmetrical, no skin changes or visible lesions. No palpable masses, nipple discharge or adenopathy bilaterally.  PELVIC:   VULVA: No lesions. Normal female genitalia. Small 2mm nevus on left labia majora; 2 3 cm sebaceous cysts on left labia majora , 3mm sebaceous cyst on right labia majora  URETHRAL MEATUS: Normal size and location, no lesions, no prolapse.  URETHRA: No masses, tenderness, prolapse or scarring.  VAGINA: Moist and well rugated, no discharge, no significant cystocele or rectocele.  CERVIX: No lesions and discharge.  UTERUS: Normal size, regular shape, mobile, non-tender, bladder base nontender.  ADNEXA: No masses or tenderness.      1. Encounter for annual routine gynecological examination    2. Pap smear for cervical cancer screening    3. Screening mammogram for breast cancer    4. Screening examination for STD (sexually transmitted disease)    5. Depression, unspecified depression type          Plan:  Routine gyn s/p normal breast exam. Pap With HPV cotesting ordered. STD testing: GC/CT/trich, syphilis, HBV/HCV and HIV ordered.   2. Will send referral for therapy   3. Will set up colpo before she leaves incase abnormal again.       F/u in 1 yr or PRN

## 2023-06-01 ENCOUNTER — PATIENT MESSAGE (OUTPATIENT)
Dept: OBSTETRICS AND GYNECOLOGY | Facility: CLINIC | Age: 38
End: 2023-06-01
Payer: COMMERCIAL

## 2023-06-01 ENCOUNTER — HOSPITAL ENCOUNTER (OUTPATIENT)
Dept: RADIOLOGY | Facility: HOSPITAL | Age: 38
Discharge: HOME | End: 2023-06-01
Attending: OBSTETRICS & GYNECOLOGY
Payer: COMMERCIAL

## 2023-06-01 DIAGNOSIS — Z12.31 SCREENING MAMMOGRAM FOR BREAST CANCER: ICD-10-CM

## 2023-06-01 LAB
BACTERIAL VAGINOSIS DNA: NEGATIVE
C TRACH DNA SPEC QL NAA+PROBE: NOT DETECTED
CANDIDA GLABRATA DNA: NEGATIVE
CANDIDA KRUSEI DNA: NEGATIVE
CANDIDA RRNA VAG QL PROBE: POSITIVE
N GONORRHOEA DNA SPEC QL NAA+PROBE: NOT DETECTED
T VAGINALIS RRNA GENITAL QL PROBE: NEGATIVE

## 2023-06-01 PROCEDURE — 77063 BREAST TOMOSYNTHESIS BI: CPT | Mod: 26,,, | Performed by: RADIOLOGY

## 2023-06-01 PROCEDURE — 77067 SCR MAMMO BI INCL CAD: CPT | Mod: 26,,, | Performed by: RADIOLOGY

## 2023-06-01 PROCEDURE — 77063 MAMMO DIGITAL SCREENING BILAT WITH TOMO: ICD-10-PCS | Mod: 26,,, | Performed by: RADIOLOGY

## 2023-06-01 PROCEDURE — 77067 MAMMO DIGITAL SCREENING BILAT WITH TOMO: ICD-10-PCS | Mod: 26,,, | Performed by: RADIOLOGY

## 2023-06-01 PROCEDURE — 77067 SCR MAMMO BI INCL CAD: CPT | Mod: TC

## 2023-06-01 RX ORDER — FLUCONAZOLE 150 MG/1
150 TABLET ORAL
Qty: 2 TABLET | Refills: 0 | Status: SHIPPED | OUTPATIENT
Start: 2023-06-01 | End: 2023-06-05

## 2023-06-05 LAB
FINAL PATHOLOGIC DIAGNOSIS: NORMAL
Lab: NORMAL

## 2023-06-07 LAB
HPV HR 12 DNA SPEC QL NAA+PROBE: NEGATIVE
HPV16 AG SPEC QL: NEGATIVE
HPV18 DNA SPEC QL NAA+PROBE: NEGATIVE

## 2023-06-13 ENCOUNTER — PATIENT MESSAGE (OUTPATIENT)
Dept: OBSTETRICS AND GYNECOLOGY | Facility: CLINIC | Age: 38
End: 2023-06-13
Payer: COMMERCIAL

## 2023-07-18 ENCOUNTER — TELEPHONE (OUTPATIENT)
Dept: SMOKING CESSATION | Facility: CLINIC | Age: 38
End: 2023-07-18
Payer: COMMERCIAL

## 2025-02-18 ENCOUNTER — HOSPITAL ENCOUNTER (OUTPATIENT)
Dept: RADIOLOGY | Facility: HOSPITAL | Age: 40
Discharge: HOME | End: 2025-02-18
Attending: OBSTETRICS & GYNECOLOGY
Payer: COMMERCIAL

## 2025-02-18 VITALS — HEIGHT: 66 IN | WEIGHT: 192 LBS | BODY MASS INDEX: 30.86 KG/M2

## 2025-02-18 DIAGNOSIS — Z12.31 ENCOUNTER FOR SCREENING MAMMOGRAM FOR BREAST CANCER: ICD-10-CM

## 2025-02-18 PROCEDURE — 77067 SCR MAMMO BI INCL CAD: CPT | Mod: TC

## 2025-05-09 ENCOUNTER — OFFICE VISIT (OUTPATIENT)
Dept: OBSTETRICS AND GYNECOLOGY | Facility: CLINIC | Age: 40
End: 2025-05-09
Payer: COMMERCIAL

## 2025-05-09 VITALS — SYSTOLIC BLOOD PRESSURE: 124 MMHG | DIASTOLIC BLOOD PRESSURE: 68 MMHG | BODY MASS INDEX: 31.6 KG/M2 | WEIGHT: 195.75 LBS

## 2025-05-09 DIAGNOSIS — Z12.31 SCREENING MAMMOGRAM FOR BREAST CANCER: ICD-10-CM

## 2025-05-09 DIAGNOSIS — Z86.19 HISTORY OF HPV INFECTION: ICD-10-CM

## 2025-05-09 DIAGNOSIS — Z12.4 PAP SMEAR FOR CERVICAL CANCER SCREENING: ICD-10-CM

## 2025-05-09 DIAGNOSIS — Z01.419 ENCOUNTER FOR ANNUAL ROUTINE GYNECOLOGICAL EXAMINATION: Primary | ICD-10-CM

## 2025-05-09 PROCEDURE — 99999 PR PBB SHADOW E&M-EST. PATIENT-LVL III: CPT | Mod: PBBFAC,,, | Performed by: OBSTETRICS & GYNECOLOGY

## 2025-05-09 NOTE — PROGRESS NOTES
"  Chief Complaint   Patient presents with    Well Woman     Having abnormal pap in the past        HISTORY OF PRESENT ILLNESS:   Divina Salmeron is a 40 y.o. female  who presents for well woman exam.  Patient's last menstrual period was 2025 (approximate)..  Cycles are regular.      Past Medical History:   Diagnosis Date    Oral herpes         History reviewed. No pertinent surgical history.      Social History     Socioeconomic History    Marital status: Single     Spouse name: Warren [facility] [employer] [facility] [employer]   Occupational History    Occupation: garden center      Employer: [employer]   Tobacco Use    Smoking status: Every Day     Current packs/day: 1.00     Average packs/day: 1 pack/day for 22.4 years (22.4 ttl pk-yrs)     Types: Cigarettes     Start date:     Smokeless tobacco: Never   Substance and Sexual Activity    Alcohol use: Yes     Comment: not with pregnancy    Drug use: No     Types: Marijuana, Other-see comments    Sexual activity: Not Currently     Partners: Male   Social History Narrative    Pt: worked at "[employer]" likes outdoors    Partner: Warren - works at  [employer], bipolar OCD, heart disorder    Son: Josef       Family History   Problem Relation Name Age of Onset    No Known Problems Paternal Grandfather MI     Hypertension Father      Diabetes Mother Type 2     Hypertension Mother Type 2     Breast cancer Paternal Aunt      Cancer Other great aunt brain          OB History    Para Term  AB Living   3 1 1  2 1   SAB IAB Ectopic Multiple Live Births    2  0 1      # Outcome Date GA Lbr Chris/2nd Weight Sex Type Anes PTL Lv   3 Term 02/27/15 41w2d / 00:12 4.252 kg (9 lb 6 oz) M Vag-Spont Local N LUTHER   2 IAB  7w0d          1 IAB  8w0d                COMPREHENSIVE GYN HISTORY:  PAP History:  NILM/HPV16 and non 16/18 +, no colpo; NILM/HPV +, no colpo;  NILM/HPV-  Infection History: had genital warts in the past. Denies PID.  Benign History:Denies uterine fibroids. Denies " ovarian cysts. Denies endometriosis Denies other conditions.  Cancer History: Denies cervical cancer. Denies uterine cancer or hyperplasia. Denies ovarian cancer. Denies vulvar cancer or pre-cancer. Denies vaginal cancer or pre-cancer. Denies breast cancer. Denies colon cancer.  Cycle: 13/mon/3-4  Contraception: condoms     ROS:  Negative     /68   Wt 88.8 kg (195 lb 12.3 oz)   LMP 04/14/2025 (Approximate)   BMI 31.60 kg/m²     APPEARANCE: Well nourished, well developed, in no acute distress.  NECK: Neck symmetric   ABDOMEN: Soft. No tenderness or masses. No hernias. No hepatosplenomegaly.  BREASTS: Symmetrical, no skin changes or visible lesions. No palpable masses, nipple discharge or adenopathy bilaterally.  PELVIC:   VULVA: No lesions. Normal female genitalia. Small 2mm nevus on left labia majora; 2 3 cm sebaceous cysts on left labia majora , 3mm sebaceous cyst on right labia majora  URETHRAL MEATUS: Normal size and location, no lesions, no prolapse.  URETHRA: No masses, tenderness, prolapse or scarring.  VAGINA: Moist and well rugated, no discharge, no significant cystocele or rectocele.  CERVIX: No lesions and discharge.  UTERUS: Normal size, regular shape, mobile, non-tender, bladder base nontender.  ADNEXA: No masses or tenderness.      1. Encounter for annual routine gynecological examination    2. Pap smear for cervical cancer screening    3. History of HPV infection    4. Screening mammogram for breast cancer            Plan:  Routine gyn s/p normal breast exam. Pap With HPV cotesting ordered, if normal the ok to space out.          F/u in 1 yr or PRN

## 2025-06-03 ENCOUNTER — PATIENT MESSAGE (OUTPATIENT)
Dept: OBSTETRICS AND GYNECOLOGY | Facility: CLINIC | Age: 40
End: 2025-06-03
Payer: COMMERCIAL